# Patient Record
Sex: MALE | Race: WHITE | NOT HISPANIC OR LATINO
[De-identification: names, ages, dates, MRNs, and addresses within clinical notes are randomized per-mention and may not be internally consistent; named-entity substitution may affect disease eponyms.]

---

## 2020-01-10 ENCOUNTER — APPOINTMENT (OUTPATIENT)
Dept: CARDIOLOGY | Facility: CLINIC | Age: 55
End: 2020-01-10

## 2020-02-06 ENCOUNTER — APPOINTMENT (OUTPATIENT)
Dept: CARDIOLOGY | Facility: CLINIC | Age: 55
End: 2020-02-06
Payer: COMMERCIAL

## 2020-02-06 PROCEDURE — 93000 ELECTROCARDIOGRAM COMPLETE: CPT

## 2020-02-06 PROCEDURE — 99214 OFFICE O/P EST MOD 30 MIN: CPT

## 2020-02-20 PROBLEM — Z00.00 ENCOUNTER FOR PREVENTIVE HEALTH EXAMINATION: Status: ACTIVE | Noted: 2020-02-20

## 2020-07-15 ENCOUNTER — APPOINTMENT (OUTPATIENT)
Dept: CARDIOLOGY | Facility: CLINIC | Age: 55
End: 2020-07-15

## 2020-07-30 ENCOUNTER — RECORD ABSTRACTING (OUTPATIENT)
Age: 55
End: 2020-07-30

## 2020-07-30 DIAGNOSIS — I73.00 RAYNAUD'S SYNDROME W/OUT GANGRENE: ICD-10-CM

## 2020-07-30 DIAGNOSIS — Z86.59 PERSONAL HISTORY OF OTHER MENTAL AND BEHAVIORAL DISORDERS: ICD-10-CM

## 2020-07-30 DIAGNOSIS — Z82.49 FAMILY HISTORY OF ISCHEMIC HEART DISEASE AND OTHER DISEASES OF THE CIRCULATORY SYSTEM: ICD-10-CM

## 2020-07-30 DIAGNOSIS — E66.9 OBESITY, UNSPECIFIED: ICD-10-CM

## 2020-07-30 DIAGNOSIS — Z78.9 OTHER SPECIFIED HEALTH STATUS: ICD-10-CM

## 2020-07-30 DIAGNOSIS — Z86.79 PERSONAL HISTORY OF OTHER DISEASES OF THE CIRCULATORY SYSTEM: ICD-10-CM

## 2020-07-30 RX ORDER — ALPRAZOLAM 0.25 MG/1
0.25 TABLET ORAL
Refills: 0 | Status: ACTIVE | COMMUNITY

## 2020-07-30 RX ORDER — ASCORBIC ACID 500 MG
TABLET ORAL
Refills: 0 | Status: ACTIVE | COMMUNITY

## 2020-08-12 ENCOUNTER — APPOINTMENT (OUTPATIENT)
Dept: CARDIOLOGY | Facility: CLINIC | Age: 55
End: 2020-08-12

## 2021-01-26 ENCOUNTER — APPOINTMENT (OUTPATIENT)
Dept: CARDIOLOGY | Facility: CLINIC | Age: 56
End: 2021-01-26
Payer: COMMERCIAL

## 2021-01-26 VITALS
DIASTOLIC BLOOD PRESSURE: 90 MMHG | SYSTOLIC BLOOD PRESSURE: 142 MMHG | WEIGHT: 243 LBS | BODY MASS INDEX: 36.83 KG/M2 | HEIGHT: 68 IN

## 2021-01-26 PROCEDURE — 93000 ELECTROCARDIOGRAM COMPLETE: CPT

## 2021-01-26 PROCEDURE — 99214 OFFICE O/P EST MOD 30 MIN: CPT

## 2021-01-26 PROCEDURE — 99072 ADDL SUPL MATRL&STAF TM PHE: CPT

## 2021-01-26 RX ORDER — AMLODIPINE BESYLATE 5 MG/1
5 TABLET ORAL DAILY
Qty: 90 | Refills: 0 | Status: DISCONTINUED | COMMUNITY
End: 2021-01-26

## 2021-01-27 RX ORDER — TESTOSTERONE 20.25 MG/1.25G
1.62 GEL, METERED TRANSDERMAL
Qty: 75 | Refills: 0 | Status: ACTIVE | COMMUNITY
Start: 2020-09-01

## 2021-01-27 NOTE — ASSESSMENT
[FreeTextEntry1] : 55-yo male with h/o obesity, HTN, hyperlipidemia.\par Episodes of chest pain c/w angina on exertion.\par \par Plan:\par Continue Bystolic and Rosuvastatin.\par Start ASA 81 mg daily.\par Start Amlodipine 5 mg in the evening.\par 2D ECHO and BW recently done by PMD, will review.\par Exercise MPI.\par F/u in 2 weeks.\par \par Jairon Weinstein MD\par

## 2021-01-27 NOTE — PHYSICAL EXAM
[General Appearance - Well Developed] : well developed [Normal Appearance] : normal appearance [Well Groomed] : well groomed [General Appearance - Well Nourished] : well nourished [No Deformities] : no deformities [General Appearance - In No Acute Distress] : no acute distress [Normal Conjunctiva] : the conjunctiva exhibited no abnormalities [Respiration, Rhythm And Depth] : normal respiratory rhythm and effort [Exaggerated Use Of Accessory Muscles For Inspiration] : no accessory muscle use [Auscultation Breath Sounds / Voice Sounds] : lungs were clear to auscultation bilaterally [Heart Rate And Rhythm] : heart rate and rhythm were normal [Heart Sounds] : normal S1 and S2 [Murmurs] : no murmurs present [Arterial Pulses Normal] : the arterial pulses were normal [Edema] : no peripheral edema present [Abdomen Soft] : soft [Bowel Sounds] : normal bowel sounds [Abdomen Tenderness] : non-tender [Cyanosis, Localized] : no localized cyanosis [Skin Color & Pigmentation] : normal skin color and pigmentation [] : no rash [Oriented To Time, Place, And Person] : oriented to person, place, and time

## 2021-01-27 NOTE — HISTORY OF PRESENT ILLNESS
[FreeTextEntry1] : Patient is a 55-yo male with h/o HTN and hyperlipidemia. Patient has retired in the last few months, has not been exercising, gained weight. In the last few weeks, he has experienced substernal chest tightness with ambulation, relieved by rest. He denies CP or SOB at rest.\par \par His BP has also been poorly controlled.

## 2021-01-27 NOTE — REVIEW OF SYSTEMS
[Blurry Vision] : no blurred vision [Seeing Double (Diplopia)] : no diplopia [see HPI] : see HPI [Lower Ext Edema] : no extremity edema [Palpitations] : no palpitations [Leg Claudication] : no intermittent leg claudication [Skin: A Rash] : no rash: [Anxiety] : no anxiety [Negative] : Endocrine

## 2021-02-05 ENCOUNTER — RESULT REVIEW (OUTPATIENT)
Age: 56
End: 2021-02-05

## 2021-02-05 ENCOUNTER — OUTPATIENT (OUTPATIENT)
Dept: OUTPATIENT SERVICES | Facility: HOSPITAL | Age: 56
LOS: 1 days | Discharge: HOME | End: 2021-02-05
Payer: COMMERCIAL

## 2021-02-05 DIAGNOSIS — R07.9 CHEST PAIN, UNSPECIFIED: ICD-10-CM

## 2021-02-05 PROCEDURE — 93018 CV STRESS TEST I&R ONLY: CPT

## 2021-02-05 PROCEDURE — 78452 HT MUSCLE IMAGE SPECT MULT: CPT | Mod: 26

## 2021-02-09 ENCOUNTER — APPOINTMENT (OUTPATIENT)
Dept: CARDIOLOGY | Facility: CLINIC | Age: 56
End: 2021-02-09
Payer: COMMERCIAL

## 2021-02-09 VITALS
TEMPERATURE: 98.3 F | DIASTOLIC BLOOD PRESSURE: 90 MMHG | WEIGHT: 243 LBS | HEIGHT: 68 IN | BODY MASS INDEX: 36.83 KG/M2 | SYSTOLIC BLOOD PRESSURE: 139 MMHG

## 2021-02-09 VITALS — HEART RATE: 76 BPM

## 2021-02-09 PROCEDURE — 99214 OFFICE O/P EST MOD 30 MIN: CPT

## 2021-02-09 PROCEDURE — 93000 ELECTROCARDIOGRAM COMPLETE: CPT

## 2021-02-09 PROCEDURE — 99072 ADDL SUPL MATRL&STAF TM PHE: CPT

## 2021-02-09 NOTE — HISTORY OF PRESENT ILLNESS
[FreeTextEntry1] : Patient is a 56-yo male with h/o HTN and hyperlipidemia. Patient has retired in the last few months, has not been exercising, gained weight. In the last few weeks, he has experienced substernal chest tightness with ambulation, relieved by rest. He denies CP or SOB at rest.\par \par His BP has also been better controlled since last visit.\par \par GFR 62.\par .\par \par MPI (reviewed personally): severe inferior, inferolateral ischemia.

## 2021-02-09 NOTE — PHYSICAL EXAM
[General Appearance - Well Developed] : well developed [Normal Appearance] : normal appearance [Well Groomed] : well groomed [General Appearance - Well Nourished] : well nourished [No Deformities] : no deformities [General Appearance - In No Acute Distress] : no acute distress [Normal Conjunctiva] : the conjunctiva exhibited no abnormalities [Respiration, Rhythm And Depth] : normal respiratory rhythm and effort [Exaggerated Use Of Accessory Muscles For Inspiration] : no accessory muscle use [Auscultation Breath Sounds / Voice Sounds] : lungs were clear to auscultation bilaterally [Heart Rate And Rhythm] : heart rate and rhythm were normal [Heart Sounds] : normal S1 and S2 [Murmurs] : no murmurs present [Edema] : no peripheral edema present [Arterial Pulses Normal] : the arterial pulses were normal [Bowel Sounds] : normal bowel sounds [Abdomen Soft] : soft [Abdomen Tenderness] : non-tender [Skin Color & Pigmentation] : normal skin color and pigmentation [Cyanosis, Localized] : no localized cyanosis [Oriented To Time, Place, And Person] : oriented to person, place, and time [] : no rash

## 2021-02-09 NOTE — ASSESSMENT
[FreeTextEntry1] : 56-yo male with h/o HTN, hyperlipidemia who presents with progressive angina on exertion (class 3 at this point), evidence of ischemia in RCA/LCX territory.\par \par Plan>\par Continue ASA, Bystolic, Amlodipine.\par Add Imdur 30 mg daily.\par Increase Rosuvastatin to 20 mg daily.\par Awaiting ECHO report from PMD, may have to repeat.\par C discussed with patient and wife, all questions answered. Will schedule next week.\par Patient advised to avoid strenuous activity until then.\par \par \par Jairon Weinstein MD\par

## 2021-02-11 ENCOUNTER — INPATIENT (INPATIENT)
Facility: HOSPITAL | Age: 56
LOS: 4 days | Discharge: HOME HEALTH | End: 2021-02-16
Attending: THORACIC SURGERY (CARDIOTHORACIC VASCULAR SURGERY) | Admitting: THORACIC SURGERY (CARDIOTHORACIC VASCULAR SURGERY)
Payer: COMMERCIAL

## 2021-02-11 VITALS
DIASTOLIC BLOOD PRESSURE: 90 MMHG | RESPIRATION RATE: 20 BRPM | HEART RATE: 78 BPM | WEIGHT: 238.1 LBS | TEMPERATURE: 97 F | SYSTOLIC BLOOD PRESSURE: 148 MMHG | OXYGEN SATURATION: 98 %

## 2021-02-11 LAB
ALBUMIN SERPL ELPH-MCNC: 4.9 G/DL — SIGNIFICANT CHANGE UP (ref 3.5–5.2)
ALP SERPL-CCNC: 45 U/L — SIGNIFICANT CHANGE UP (ref 30–115)
ALT FLD-CCNC: 34 U/L — SIGNIFICANT CHANGE UP (ref 0–41)
ANION GAP SERPL CALC-SCNC: 9 MMOL/L — SIGNIFICANT CHANGE UP (ref 7–14)
APTT BLD: 36.7 SEC — SIGNIFICANT CHANGE UP (ref 27–39.2)
AST SERPL-CCNC: 33 U/L — SIGNIFICANT CHANGE UP (ref 0–41)
BASOPHILS # BLD AUTO: 0.06 K/UL — SIGNIFICANT CHANGE UP (ref 0–0.2)
BASOPHILS NFR BLD AUTO: 0.7 % — SIGNIFICANT CHANGE UP (ref 0–1)
BILIRUB SERPL-MCNC: 1 MG/DL — SIGNIFICANT CHANGE UP (ref 0.2–1.2)
BLD GP AB SCN SERPL QL: SIGNIFICANT CHANGE UP
BUN SERPL-MCNC: 18 MG/DL — SIGNIFICANT CHANGE UP (ref 10–20)
CALCIUM SERPL-MCNC: 9.5 MG/DL — SIGNIFICANT CHANGE UP (ref 8.5–10.1)
CHLORIDE SERPL-SCNC: 99 MMOL/L — SIGNIFICANT CHANGE UP (ref 98–110)
CO2 SERPL-SCNC: 27 MMOL/L — SIGNIFICANT CHANGE UP (ref 17–32)
CREAT SERPL-MCNC: 1.1 MG/DL — SIGNIFICANT CHANGE UP (ref 0.7–1.5)
EOSINOPHIL # BLD AUTO: 0.12 K/UL — SIGNIFICANT CHANGE UP (ref 0–0.7)
EOSINOPHIL NFR BLD AUTO: 1.4 % — SIGNIFICANT CHANGE UP (ref 0–8)
GLUCOSE BLDC GLUCOMTR-MCNC: 92 MG/DL — SIGNIFICANT CHANGE UP (ref 70–99)
GLUCOSE SERPL-MCNC: 96 MG/DL — SIGNIFICANT CHANGE UP (ref 70–99)
HCT VFR BLD CALC: 42.8 % — SIGNIFICANT CHANGE UP (ref 42–52)
HGB BLD-MCNC: 14.9 G/DL — SIGNIFICANT CHANGE UP (ref 14–18)
IMM GRANULOCYTES NFR BLD AUTO: 0.5 % — HIGH (ref 0.1–0.3)
INR BLD: 1.1 RATIO — SIGNIFICANT CHANGE UP (ref 0.65–1.3)
LYMPHOCYTES # BLD AUTO: 2.78 K/UL — SIGNIFICANT CHANGE UP (ref 1.2–3.4)
LYMPHOCYTES # BLD AUTO: 33.5 % — SIGNIFICANT CHANGE UP (ref 20.5–51.1)
MAGNESIUM SERPL-MCNC: 2.1 MG/DL — SIGNIFICANT CHANGE UP (ref 1.8–2.4)
MCHC RBC-ENTMCNC: 30.4 PG — SIGNIFICANT CHANGE UP (ref 27–31)
MCHC RBC-ENTMCNC: 34.8 G/DL — SIGNIFICANT CHANGE UP (ref 32–37)
MCV RBC AUTO: 87.3 FL — SIGNIFICANT CHANGE UP (ref 80–94)
MONOCYTES # BLD AUTO: 0.56 K/UL — SIGNIFICANT CHANGE UP (ref 0.1–0.6)
MONOCYTES NFR BLD AUTO: 6.7 % — SIGNIFICANT CHANGE UP (ref 1.7–9.3)
NEUTROPHILS # BLD AUTO: 4.75 K/UL — SIGNIFICANT CHANGE UP (ref 1.4–6.5)
NEUTROPHILS NFR BLD AUTO: 57.2 % — SIGNIFICANT CHANGE UP (ref 42.2–75.2)
NRBC # BLD: 0 /100 WBCS — SIGNIFICANT CHANGE UP (ref 0–0)
NT-PROBNP SERPL-SCNC: 98 PG/ML — SIGNIFICANT CHANGE UP (ref 0–300)
PLATELET # BLD AUTO: 197 K/UL — SIGNIFICANT CHANGE UP (ref 130–400)
POTASSIUM SERPL-MCNC: 4.2 MMOL/L — SIGNIFICANT CHANGE UP (ref 3.5–5)
POTASSIUM SERPL-SCNC: 4.2 MMOL/L — SIGNIFICANT CHANGE UP (ref 3.5–5)
PROT SERPL-MCNC: 8 G/DL — SIGNIFICANT CHANGE UP (ref 6–8)
PROTHROM AB SERPL-ACNC: 12.7 SEC — SIGNIFICANT CHANGE UP (ref 9.95–12.87)
RAPID RVP RESULT: SIGNIFICANT CHANGE UP
RBC # BLD: 4.9 M/UL — SIGNIFICANT CHANGE UP (ref 4.7–6.1)
RBC # FLD: 13.2 % — SIGNIFICANT CHANGE UP (ref 11.5–14.5)
SARS-COV-2 RNA SPEC QL NAA+PROBE: SIGNIFICANT CHANGE UP
SODIUM SERPL-SCNC: 135 MMOL/L — SIGNIFICANT CHANGE UP (ref 135–146)
TROPONIN T SERPL-MCNC: <0.01 NG/ML — SIGNIFICANT CHANGE UP
WBC # BLD: 8.31 K/UL — SIGNIFICANT CHANGE UP (ref 4.8–10.8)
WBC # FLD AUTO: 8.31 K/UL — SIGNIFICANT CHANGE UP (ref 4.8–10.8)

## 2021-02-11 PROCEDURE — 93010 ELECTROCARDIOGRAM REPORT: CPT

## 2021-02-11 PROCEDURE — 99285 EMERGENCY DEPT VISIT HI MDM: CPT

## 2021-02-11 PROCEDURE — 99253 IP/OBS CNSLTJ NEW/EST LOW 45: CPT

## 2021-02-11 PROCEDURE — 71045 X-RAY EXAM CHEST 1 VIEW: CPT | Mod: 26

## 2021-02-11 PROCEDURE — 93458 L HRT ARTERY/VENTRICLE ANGIO: CPT | Mod: 26,GC

## 2021-02-11 RX ORDER — SODIUM CHLORIDE 9 MG/ML
1000 INJECTION INTRAMUSCULAR; INTRAVENOUS; SUBCUTANEOUS
Refills: 0 | Status: DISCONTINUED | OUTPATIENT
Start: 2021-02-11 | End: 2021-02-16

## 2021-02-11 RX ORDER — ROSUVASTATIN CALCIUM 5 MG/1
1 TABLET ORAL
Qty: 0 | Refills: 0 | DISCHARGE

## 2021-02-11 RX ORDER — METOPROLOL TARTRATE 50 MG
25 TABLET ORAL DAILY
Refills: 0 | Status: DISCONTINUED | OUTPATIENT
Start: 2021-02-11 | End: 2021-02-12

## 2021-02-11 RX ORDER — ATORVASTATIN CALCIUM 80 MG/1
80 TABLET, FILM COATED ORAL AT BEDTIME
Refills: 0 | Status: DISCONTINUED | OUTPATIENT
Start: 2021-02-11 | End: 2021-02-12

## 2021-02-11 RX ORDER — CHLORHEXIDINE GLUCONATE 213 G/1000ML
1 SOLUTION TOPICAL
Refills: 0 | Status: DISCONTINUED | OUTPATIENT
Start: 2021-02-11 | End: 2021-02-12

## 2021-02-11 RX ORDER — ISOSORBIDE MONONITRATE 60 MG/1
30 TABLET, EXTENDED RELEASE ORAL DAILY
Refills: 0 | Status: DISCONTINUED | OUTPATIENT
Start: 2021-02-11 | End: 2021-02-12

## 2021-02-11 RX ORDER — SODIUM CHLORIDE 9 MG/ML
300 INJECTION INTRAMUSCULAR; INTRAVENOUS; SUBCUTANEOUS ONCE
Refills: 0 | Status: DISCONTINUED | OUTPATIENT
Start: 2021-02-11 | End: 2021-02-16

## 2021-02-11 RX ORDER — LANOLIN ALCOHOL/MO/W.PET/CERES
10 CREAM (GRAM) TOPICAL AT BEDTIME
Refills: 0 | Status: DISCONTINUED | OUTPATIENT
Start: 2021-02-11 | End: 2021-02-12

## 2021-02-11 RX ORDER — ASPIRIN/CALCIUM CARB/MAGNESIUM 324 MG
81 TABLET ORAL DAILY
Refills: 0 | Status: DISCONTINUED | OUTPATIENT
Start: 2021-02-11 | End: 2021-02-12

## 2021-02-11 RX ADMIN — ATORVASTATIN CALCIUM 80 MILLIGRAM(S): 80 TABLET, FILM COATED ORAL at 23:27

## 2021-02-11 RX ADMIN — Medication 10 MILLIGRAM(S): at 23:27

## 2021-02-11 RX ADMIN — SODIUM CHLORIDE 75 MILLILITER(S): 9 INJECTION INTRAMUSCULAR; INTRAVENOUS; SUBCUTANEOUS at 23:14

## 2021-02-11 NOTE — H&P ADULT - NSHPPHYSICALEXAM_GEN_ALL_CORE
This is a 56 year old male with PMHx of HTN, DLD, and ?Prediabetes who presented to the ED with chest pain.    #Chest Pain; angina  - Admit to CCU  - Cardiac cath noted triple vessel disease with large RCA. 99% subtotal lesions. AUC score 7  - Statin changed to Lipitor 80mg (high intensity)  - Start on ASA 81mg  - Check Lipid panel and a1c  - TTE ordered  - IV hydration post cath for kidney protection  - CT surgery consulted for evaluation   - Sheath to be removed in two hours.   - Heparin drip to be started 4 hours after removal of the radial sheath  - At that time begin following PTTs and maintain therapeutic range   - Start Metoprolol 25mg daily  - Home Isosorbide Mononitrate 30mg daily can be resumed    #HTN  - Started on Metoprolol 25mg daily  - Holding home bystolic 20mg and amlodipine 5mg daily for now     #HLD   - Start on Lipitor 80mg in place of home rosuvastatin 5mg     Activity: As tolerated (after sheath removal)  Diet: DASH  DVTppx: Heparin drip  GI ppx: Not indicated  Code Status: Full Code  DISPO: Acute

## 2021-02-11 NOTE — CHART NOTE - NSCHARTNOTEFT_GEN_A_CORE
Pre cath note:    Indication:                     [ ] STEMI                [ ] NSTEMI                 [ ] Acute coronary syndrome                   [ ] Unstable Angina   [ ] high risk  [ ] intermediate risk  [ ] low risk                   [ x] Stable Angina     non-invasive testing:        exercise stress          Date:         2/5/20                    result: [ ] high risk  [ x] intermediate risk  [ ] low risk                   [ ] Other:     Anti- Anginal medications:                    [ ] not used                       [x ] used                   [ ] not used but strong indication not to use    Ejection Fraction                   [ ] <29            [ ] 30-39%   [ ] 40-49%     [ ]>50%    CHF                   [ ] active (within last 14 days on meds   [ ] Chronic (on meds but no exacerbation)    COPD                   [ ] mild (on chronic bronchodilators)  [ ] moderate (on chronic steroid therapy)      [ ] severe (indication for home O2 or PACO2 >50)    Other risk factors:                     [ ] Previous MI                     [ ] CVA/ stroke                    [ ] carotid stent/ CEA                    [ ] PVD/PAD- (arterial aneurysm, non-palpable pulses, tortuous vessel with inability to insert catheter, infra-renal dissection, renal or subclavian artery stenosis)                    [ ] diabetic                    [ ] previous CABG                    [ ] Renal Failure                           14.9   8.31  )-----------( 197      ( 11 Feb 2021 15:49 )             42.8                                -Patient Scheduled for LHC/PCI tonight.   -Keep NPO   -Hold Anticoagulation prior to PCI  -Start IV Fluids NS at : 3ml/Kg for 1 Hr prior to Procedure                                          1ml/KG for 1 Hr prior to Procedure if patient has signs of Heart Failure                                             PREOPERATIVE DAY OF PROCEDURE EVALUATION:  I have personally seen and examined the patient.  I agree with the history and physical which I have reviewed and noted any changes below.  (Signed electronically by __________)  02-11-21 @ 16:47 Pre cath note:    Indication:                     [ ] STEMI                [ ] NSTEMI                 [ ] Acute coronary syndrome                   [X ] Unstable Angina   [ ] high risk  [ ] intermediate risk  [ ] low risk                   [ ] Stable Angina     non-invasive testing:        exercise stress          Date:         2/5/20                    result: [ ] high risk  [ x] intermediate risk  [ ] low risk                   [ ] Other:     Anti- Anginal medications:                    [ ] not used                       [x ] used                   [ ] not used but strong indication not to use    Ejection Fraction                   [ ] <29            [ ] 30-39%   [ ] 40-49%     [X ]>50%    CHF                   [ ] active (within last 14 days on meds   [ ] Chronic (on meds but no exacerbation)    COPD                   [ ] mild (on chronic bronchodilators)  [ ] moderate (on chronic steroid therapy)      [ ] severe (indication for home O2 or PACO2 >50)    Other risk factors:                     [ ] Previous MI                     [ ] CVA/ stroke                    [ ] carotid stent/ CEA                    [ ] PVD/PAD- (arterial aneurysm, non-palpable pulses, tortuous vessel with inability to insert catheter, infra-renal dissection, renal or subclavian artery stenosis)                    [ ] diabetic                    [ ] previous CABG                    [ ] Renal Failure                           14.9   8.31  )-----------( 197      ( 11 Feb 2021 15:49 )             42.8                                -Patient Scheduled for LHC/PCI tonight.   -Keep NPO   -Hold Anticoagulation prior to PCI  -Start IV Fluids NS at : 3ml/Kg for 1 Hr prior to Procedure                                          1ml/KG for 1 Hr prior to Procedure if patient has signs of Heart Failure                                             PREOPERATIVE DAY OF PROCEDURE EVALUATION:  I have personally seen and examined the patient.  I agree with the history and physical which I have reviewed and noted any changes below.  (Signed electronically by __________)  02-11-21 @ 16:47

## 2021-02-11 NOTE — CONSULT NOTE ADULT - ASSESSMENT
IMPRESSION  - Angina pectoris  - Positive stress test showing inferior ischemic changes.   - HTN, DL      PLAN  - Rapid covid sent  - Initial labs sent  - check a1c, lipid profile.   - Cw asa (started last month), bystolic, amlodipine, imdur, rosuvastatin 20  - Check tte  - keep npo for cath today

## 2021-02-11 NOTE — ED ADULT NURSE NOTE - OBJECTIVE STATEMENT
Pt sent in by cardiologist for stent. Pt states he was supposed to go on Tuesday but began having chest pain after lifting heavy object.

## 2021-02-11 NOTE — H&P ADULT - NSHPREVIEWOFSYSTEMS_GEN_ALL_CORE
General: No fevers, chills, weight changes	  Skin/Breast: No skin rashes or wounds  Ophthalmologic: No blurry vision, double vision, recent changes in vision  ENMT: No difficulty hearing, ringing in ears, nasal discharge, throat pain, difficulty swallowing  Respiratory and Thorax: No coughing, wheezing, shortness of breath  Cardiovascular: + chest pain on exertion, No palpitations  Gastrointestinal: No abdominal pain, constipation, diarrhea, nausea, vomiting  Genitourinary: No dysuria, polyuria, pyuria, hematuria  Musculoskeletal: No muscle aches or joint aches  Neurological: No numbness or tingling  Psychiatric: Regular mood  Hematology/Lymphatics: No easy bruising	  Endocrine: No hot or cold intolerance

## 2021-02-11 NOTE — ED PROVIDER NOTE - PROGRESS NOTE DETAILS
d/w cardiology, will come see the patient. plan is for merary to place stent tonight. d/w cardiology, will come see the patient. plan is for merary to place stent tonight. admit to tele after labs are back. pt transferred to cath lab before admission, will sign out to mar.

## 2021-02-11 NOTE — ED PROVIDER NOTE - ATTENDING CONTRIBUTION TO CARE
56yoM with h/o HTN, HLD, DM, presents for cath per Dr. Weinstein, had ischemic stress test 1 wk ago, having nonradiating midsternal CP and SOB with exertion x3-4 mths, recurrence today with lifting something however, states lasted 3-4 seconds, not 1 hr, took 1 ASA, now entirely asymptomatic. Denies fever, cough, leg swelling, v/d, and all other symptoms. On exam, afebrile, hemodynamically stable, saturating well, NAD, well appearing, sitting comfortably in bed, no WOB, speaking full sentences, head NCAT, EOMI grossly, anicteric, MMM, no JVD, RRR, nml S1/S2, no m/r/g, lungs CTAB, no w/r/r, abd soft, NT, ND, nml BS, no rebound or guarding, AAO, CN's 3-12 grossly intact, [place] spontaneously, no leg cyanosis or edema, 2+ symm radial pulses, skin warm, well perfused, no rashes or hives. Character low suspicion for PE and no e/o DVT or tachycardia/hypoxia. Character lows suspicion for dissection and no murmur or pulse asymmetry. No e/o PNA or PTX. Concern for ACS, took ASA at home and asymptomatic now. Cardio aware PTA and planning for cath later today. Patient well appearing, hemodynamically stable. Admitted to internal medicine for further monitoring, w/u, and care.

## 2021-02-11 NOTE — H&P ADULT - HISTORY OF PRESENT ILLNESS
This is a 56 year old male with PMHx of HTN, DLD, and ?Prediabetes who presented to the ED with chest pain. As per the patient reports that he had an exercise stress test done recently that was positive for symptoms and ekg changes following moderate workload. Imaging noted ischemia in the RCA area as well as hypokinesis and decreased thickening of the inferior wall. He notes that for a few months since his father passed away he has been having some stable angina. Reported exertional chest tightness alleviated with rest. He was being worked up by Dr. Weinstein with elective cath schedueld for 2/16/2021. However he noted on the day of presentation that he was lifting something heavy and he had further episode of chest tightness/pain. He had been advised to go to the ED if he had further episodes of chest pain so that is what he did.     In the ED initial vitals: /90, HR 78, T 96.8, Sat 98% on room air. Seen by cardiology who advised add on for cardiac cath. Cardiac cath performed and noted triple vessel disease. Patient to be admitted to the CCU for CT surgery workup and inpatient surgery.

## 2021-02-11 NOTE — ED ADULT TRIAGE NOTE - CHIEF COMPLAINT QUOTE
c/o chest pain and SOB x few days. Sent by cardiologist for admission. Patient has a blockage in his artery as per wife.

## 2021-02-11 NOTE — ED PROVIDER NOTE - OBJECTIVE STATEMENT
55 yo male hx of htn, hld, dm presenting with intermittent mid-sternal chest pain on exertion over the past 4-5 months, seen by merary last friday and nuc stress showed ischemia, planned for stent placement next tuesday, but had a short episode of chest pain associated with sob again 1 hour PTA while lifting heavy boxes, resolved after resting. currently denies chest pain, sob, abd pain, nausea, vomiting, cough, fever. Sent it by eMrary for plann to place stent tonight.

## 2021-02-11 NOTE — CHART NOTE - NSCHARTNOTEFT_GEN_A_CORE
PRE-OP DIAGNOSIS: angina on exertion; abnormal NST     PROCEDURE: [facility] with coronary angiography    Physician: Dr Weinstein   Assistant: Chantal    ANESTHESIA TYPE:  [  ]General Anesthesia  [  ] Sedation  [x ] Local/Regional    ESTIMATED BLOOD LOSS:    10   mL    CONDITION  [  ] Critical  [  ] Serious  [  ]Fair  [ x ]Good      SPECIMENS REMOVED (IF APPLICABLE): N/A      IV CONTRAST:    50       mL      IMPLANTS (IF APPLICABLE)      FINDINGS    Left Heart Catheterization:  LVEF%: 55  LVEDP: normal         LEFT HEART CATHETERIZATION                                    Left main  mild disease     LAD:                        Diag:    Left Circumflex:  OM:    Right Coronary Artery: very large vessel , Prox 99% lesion heavy calcified, MId 99% lesion ,   RPDA large vessel TIMI2 flow , moderate disease   RPL large vessel supplied by collaterals from left.       DOMINANCE: Right    ACCESS: right radial   CLOSURE: D stat     INTERVENTION  noen     RIGHT HEART CATHETERIZATION  PA:  PCW:  CO/CI:      POST-OP DIAGNOSIS          PLAN OF CARE  [ ] D/C Home today  [ ]  D/C in AM  [ ] Return to In-patient bed  [ ] Admit for observation  [ ] Return for staged procedure:  [ ] CT Surgery consult called  [ ]  Continue DAPT, B-blocker & Statin therapy    Results of procedure/ plan of care discussed with patient/  in detail. PRE-OP DIAGNOSIS: angina on exertion; abnormal NST     PROCEDURE: [facility] with coronary angiography    Physician: Dr Weinstein   Assistant: Chantal    ANESTHESIA TYPE:  [  ]General Anesthesia  [  ] Sedation  [x ] Local/Regional    ESTIMATED BLOOD LOSS:    10   mL    CONDITION  [  ] Critical  [  ] Serious  [  ]Fair  [ x ]Good      SPECIMENS REMOVED (IF APPLICABLE): N/A      IV CONTRAST:    50       mL      IMPLANTS (IF APPLICABLE)      FINDINGS    Left Heart Catheterization:  LVEF%: 55  LVEDP: normal         LEFT HEART CATHETERIZATION                                    Left main  mild disease     LAD:   Prox moderate disease MId 70% lesion , distal mild to moderate disease                     Diag: medium size patent    Left Circumflex: Prox moderate disease, Distal: small moderate to severe disease  OM:  60-70% lesion     Right Coronary Artery: very large vessel , Prox 99% lesion heavy calcified, MId 99% lesion ,   RPDA large vessel TIMI2 flow , moderate disease   RPL large vessel supplied by collaterals from left.       DOMINANCE: Right    ACCESS: right radial   CLOSURE: D stat     INTERVENTION  none       POST-OP DIAGNOSIS  Triple vessel disease, very large RCA with 99 subtotal lesions culprit of abnormal stress test and clinical presentation , AUC score 7 rec A for revascularization         PLAN OF CARE  -Upgrade to CCU  -asa 81 mg daily  -atorvastatin 80 mg daily  -heparin drip with no bolus to be started 4 hrs after removal of radial sheath if no bleeding and follow up PTT   -IV hydration monitor kidney function  -CT Sx evaluation   -TTE  -lipid panel and HBA1C in am     Results of procedure/ plan of care discussed with patient/  in detail. PRE-OP DIAGNOSIS: angina on exertion; abnormal NST     PROCEDURE: [facility] with coronary angiography    Physician: Dr Weinstein   Assistant: Chantal    ANESTHESIA TYPE:  [  ]General Anesthesia  [  ] Sedation  [x ] Local/Regional    ESTIMATED BLOOD LOSS:    10   mL    CONDITION  [  ] Critical  [  ] Serious  [  ]Fair  [ x ]Good      SPECIMENS REMOVED (IF APPLICABLE): N/A      IV CONTRAST:    50       mL      IMPLANTS (IF APPLICABLE)      FINDINGS    Left Heart Catheterization:  LVEF%: 55  LVEDP: normal         LEFT HEART CATHETERIZATION                                    Left main  mild disease     LAD:   Prox moderate disease MId 70% lesion , distal mild to moderate disease                     Diag: medium size patent    Left Circumflex: Prox moderate disease, Distal: small moderate to severe disease  OM:  70% lesion     Right Coronary Artery: very large vessel , Prox 99% lesion heavy calcified, MId 99% lesion ,   RPDA large vessel TIMI2 flow , moderate disease   RPL large vessel supplied by collaterals from left.       DOMINANCE: Right    ACCESS: right radial   CLOSURE: D stat     INTERVENTION  none       POST-OP DIAGNOSIS  Triple vessel disease, very large RCA with 99 subtotal lesions culprit of abnormal stress test and clinical presentation , AUC score 7 rec A for revascularization         PLAN OF CARE  -Upgrade to CCU  -asa 81 mg daily  -atorvastatin 80 mg daily  -heparin drip with no bolus to be started 4 hrs after removal of radial sheath if no bleeding and follow up PTT   -IV hydration monitor kidney function  -CT Sx evaluation   -TTE  -lipid panel and HBA1C in am     Results of procedure/ plan of care discussed with patient/  in detail.

## 2021-02-11 NOTE — CONSULT NOTE ADULT - SUBJECTIVE AND OBJECTIVE BOX
Outpt cardiologist:  Dr. Weinstein    HPI:  57 yo M,  pmh HTN, DL, with recent exercise stress test done yesterday 2/10 that was positive for symptoms and ekg changes moderate workload,  Imaging with ischemia in rca territory, and Gated imaging reveals hypokinesis and decreased thickening of inferior wall cw post stress stunning.      Pt had been having symptoms of angina with exertional chest tightness, alleviated with rest.          PAST MEDICAL & SURGICAL HISTORY  HTN  DL  CAD    FAMILY HISTORY:  FAMILY HISTORY:      SOCIAL HISTORY:  Social History: nonsmoker      ALLERGIES:  No Known Allergies      MEDICATIONS:  asa,  nitro sl, imdur 30, amlodipine 5, rosuvastatin 20, bystolic 20  Xanax, coq10, vitamins.         HOME MEDICATIONS:  Home Medications:      VITALS:   T(F): 96.8 ( @ 15:35), Max: 96.8 ( @ 15:35)  HR: 78 ( @ 15:35) (78 - 78)  BP: 148/90 ( @ 15:35) (148/90 - 148/90)  BP(mean): --  RR: 20 ( @ 15:35) (20 - 20)  SpO2: 98% ( @ 15:35) (98% - 98%)    I&O's Summary      REVIEW OF SYSTEMS:  CONSTITUTIONAL: No weakness, fevers or chills  HEENT: No visual changes, neck/ear pain  RESPIRATORY: No cough, sob  CARDIOVASCULAR: See HPI  GASTROINTESTINAL: No abdominal pain. No nausea, vomiting, diarrhea   GENITOURINARY: No dysuria, frequency or hematuria  NEUROLOGICAL: No new focal deficits  SKIN: No new rashes    PHYSICAL EXAM:  General: Not in distress.  Non-toxic appearing.   HEENT: EOMI  Cardio: regular, S1, S2, no murmur  Pulm: B/L BS.  No wheezing / crackles / rales  Abdomen: Soft, non-tender, non-distended. Normoactive bowel sounds  Extremities: No edema b/l le  Neuro: A&O x3. No focal deficits    LABS:                    Troponin trend:            RADIOLOGY:  -CXR:  -TTE:  -CCTA:  -STRESS TEST:  < from: NM Nuclear Stress Multiple (21 @ 09:52) >  Impression:  1. Positive stress test for exercise induced ischemia with respect to symptoms at a moderate workload.  2. Positive stress test for exercise induced ischemia with respect to electrocardiographic changes at a moderate workload.  3. Myocardialimaging reveals ischemia in the distribution of the right mid to distal right coronary artery  4. Gated imaging reveals hypokinesis and decreased thickening of the inferior wall with ejection fraction within normal limits consistent with post stress stunning  Recommendation:  Medical therapy.    Risk factor modification.    If clinically indicated consider coronary arteriography    < end of copied text >  -CATHETERIZATION:  -OTHER:  EC Lead ECG:   Ventricular Rate 72 BPM    Atrial Rate 72 BPM    P-R Interval 144 ms    QRS Duration 94 ms    Q-T Interval 374 ms    QTC Calculation(Bazett) 409 ms    P Axis 31 degrees    R Axis 60 degrees    T Axis 3 degrees    Diagnosis Line Normal sinus rhythm  Normal ECG    Confirmed by Alex Acosta ([id number]) on 2021 3:56:12 PM ( @ 15:34)      TELEMETRY EVENTS:   Outpt cardiologist:  Dr. Weinstein    HPI:  57 yo M,  pmh HTN, DL, with recent exercise stress test done yesterday  that was positive for symptoms and ekg changes moderate workload,  Imaging with ischemia in rca territory, and Gated imaging reveals hypokinesis and decreased thickening of inferior wall cw post stress stunning.        Pt had been having symptoms of angina with exertional chest tightness, alleviated with rest.  Following the stress test above, Pt had been scheduled for elective cardiac cath next week, but instructed to call back if any recurrence of pain.   Pt was  today while carrying something heavy pt developed further symptoms of chest pan / tightness,  and was instructed to come to ED.   Pt currently pain free,  states the pain did resolve with rest as his chest pains had been over past several months.      Pt states he had been relatively inactive over past several months,  and was previously only on bystolic for HTN.  Only recently was discovered to have DL, and asa / statin / imdur started ~week ago.    Understands the risks/benefits of cardiac cath.  On schedule for cath today .       Denied any recent infectiosn / fevers / cough / le swelling.   sob and cp when doing something exertional, such as carrying heavy items, going up stairs, etc.     PAST MEDICAL & SURGICAL HISTORY  HTN  DL  CAD    FAMILY HISTORY:  FAMILY HISTORY:      SOCIAL HISTORY:  Social History: nonsmoker      ALLERGIES:  No Known Allergies      MEDICATIONS:  asa,  nitro sl, imdur 30, amlodipine 5, rosuvastatin 20, bystolic 20  Xanax, coq10, vitamins.         HOME MEDICATIONS:  Home Medications:      VITALS:   T(F): 96.8 ( @ 15:35), Max: 96.8 ( @ 15:35)  HR: 78 ( @ 15:35) (78 - 78)  BP: 148/90 ( @ 15:35) (148/90 - 148/90)  BP(mean): --  RR: 20 ( @ 15:35) (20 - 20)  SpO2: 98% ( @ 15:35) (98% - 98%)    I&O's Summary      REVIEW OF SYSTEMS:  CONSTITUTIONAL: No weakness, fevers or chills  HEENT: No visual changes, neck/ear pain  RESPIRATORY: No cough, sob  CARDIOVASCULAR: See HPI  GASTROINTESTINAL: No abdominal pain. No nausea, vomiting, diarrhea   GENITOURINARY: No dysuria, frequency or hematuria  NEUROLOGICAL: No new focal deficits  SKIN: No new rashes    PHYSICAL EXAM:  General: Not in distress.  Non-toxic appearing.   HEENT: EOMI  Cardio: regular, S1, S2, no murmur  Pulm: B/L BS.  No wheezing / crackles / rales  Abdomen: Soft, non-tender, non-distended. Normoactive bowel sounds  Extremities: No edema b/l le  Neuro: A&O x3. No focal deficits    LABS:                    Troponin trend:            RADIOLOGY:  -CXR:  -TTE:  -CCTA:  -STRESS TEST:  < from: NM Nuclear Stress Multiple (21 @ 09:52) >  Impression:  1. Positive stress test for exercise induced ischemia with respect to symptoms at a moderate workload.  2. Positive stress test for exercise induced ischemia with respect to electrocardiographic changes at a moderate workload.  3. Myocardialimaging reveals ischemia in the distribution of the right mid to distal right coronary artery  4. Gated imaging reveals hypokinesis and decreased thickening of the inferior wall with ejection fraction within normal limits consistent with post stress stunning  Recommendation:  Medical therapy.    Risk factor modification.    If clinically indicated consider coronary arteriography    < end of copied text >  -CATHETERIZATION:  -OTHER:  EC Lead ECG:   Ventricular Rate 72 BPM    Atrial Rate 72 BPM    P-R Interval 144 ms    QRS Duration 94 ms    Q-T Interval 374 ms    QTC Calculation(Bazett) 409 ms    P Axis 31 degrees    R Axis 60 degrees    T Axis 3 degrees    Diagnosis Line Normal sinus rhythm  Normal ECG    Confirmed by Alex Acosta ([id number]) on 2021 3:56:12 PM ( @ 15:34)      TELEMETRY EVENTS:

## 2021-02-11 NOTE — H&P ADULT - ASSESSMENT
Patient seen and examined. Cath results (films and report) reviewed. Discussed with CCU team in rounds. 3 vessel disease with a thrombotic sub-total occlusion of a large dominant RCA, as well as LAD disease and mild LM disease.  Not a candidate for percutaneous intervention and will need surgical revascularization.  Keep in CCU, given the severety of his disease, presentation with ACS.  C/w ASA, Heparin gtt, no Plavix. F/u PTT  B-blocker, statin, nitrates.   CT surgery follow-up for timing of CABG.  2D echo

## 2021-02-12 ENCOUNTER — TRANSCRIPTION ENCOUNTER (OUTPATIENT)
Age: 56
End: 2021-02-12

## 2021-02-12 LAB
A1C WITH ESTIMATED AVERAGE GLUCOSE RESULT: 6 % — HIGH (ref 4–5.6)
A1C WITH ESTIMATED AVERAGE GLUCOSE RESULT: 6 % — HIGH (ref 4–5.6)
ALBUMIN SERPL ELPH-MCNC: 3.9 G/DL — SIGNIFICANT CHANGE UP (ref 3.5–5.2)
ALP SERPL-CCNC: 23 U/L — LOW (ref 30–115)
ALT FLD-CCNC: 15 U/L — SIGNIFICANT CHANGE UP (ref 0–41)
ANION GAP SERPL CALC-SCNC: 12 MMOL/L — SIGNIFICANT CHANGE UP (ref 7–14)
ANION GAP SERPL CALC-SCNC: 14 MMOL/L — SIGNIFICANT CHANGE UP (ref 7–14)
APTT BLD: 29.5 SEC — SIGNIFICANT CHANGE UP (ref 27–39.2)
APTT BLD: 36.5 SEC — SIGNIFICANT CHANGE UP (ref 27–39.2)
AST SERPL-CCNC: 33 U/L — SIGNIFICANT CHANGE UP (ref 0–41)
BASOPHILS # BLD AUTO: 0.02 K/UL — SIGNIFICANT CHANGE UP (ref 0–0.2)
BASOPHILS # BLD AUTO: 0.04 K/UL — SIGNIFICANT CHANGE UP (ref 0–0.2)
BASOPHILS NFR BLD AUTO: 0.3 % — SIGNIFICANT CHANGE UP (ref 0–1)
BASOPHILS NFR BLD AUTO: 0.6 % — SIGNIFICANT CHANGE UP (ref 0–1)
BILIRUB SERPL-MCNC: 0.8 MG/DL — SIGNIFICANT CHANGE UP (ref 0.2–1.2)
BUN SERPL-MCNC: 14 MG/DL — SIGNIFICANT CHANGE UP (ref 10–20)
BUN SERPL-MCNC: 16 MG/DL — SIGNIFICANT CHANGE UP (ref 10–20)
CALCIUM SERPL-MCNC: 8.5 MG/DL — SIGNIFICANT CHANGE UP (ref 8.5–10.1)
CALCIUM SERPL-MCNC: 9.4 MG/DL — SIGNIFICANT CHANGE UP (ref 8.5–10.1)
CHLORIDE SERPL-SCNC: 102 MMOL/L — SIGNIFICANT CHANGE UP (ref 98–110)
CHLORIDE SERPL-SCNC: 105 MMOL/L — SIGNIFICANT CHANGE UP (ref 98–110)
CHOLEST SERPL-MCNC: 123 MG/DL — SIGNIFICANT CHANGE UP
CO2 SERPL-SCNC: 20 MMOL/L — SIGNIFICANT CHANGE UP (ref 17–32)
CO2 SERPL-SCNC: 22 MMOL/L — SIGNIFICANT CHANGE UP (ref 17–32)
CREAT SERPL-MCNC: 1 MG/DL — SIGNIFICANT CHANGE UP (ref 0.7–1.5)
CREAT SERPL-MCNC: 1 MG/DL — SIGNIFICANT CHANGE UP (ref 0.7–1.5)
EOSINOPHIL # BLD AUTO: 0.03 K/UL — SIGNIFICANT CHANGE UP (ref 0–0.7)
EOSINOPHIL # BLD AUTO: 0.09 K/UL — SIGNIFICANT CHANGE UP (ref 0–0.7)
EOSINOPHIL NFR BLD AUTO: 0.4 % — SIGNIFICANT CHANGE UP (ref 0–8)
EOSINOPHIL NFR BLD AUTO: 1.4 % — SIGNIFICANT CHANGE UP (ref 0–8)
ESTIMATED AVERAGE GLUCOSE: 126 MG/DL — HIGH (ref 68–114)
ESTIMATED AVERAGE GLUCOSE: 126 MG/DL — HIGH (ref 68–114)
GAS PNL BLDA: SIGNIFICANT CHANGE UP
GLUCOSE BLDC GLUCOMTR-MCNC: 130 MG/DL — HIGH (ref 70–99)
GLUCOSE SERPL-MCNC: 102 MG/DL — HIGH (ref 70–99)
GLUCOSE SERPL-MCNC: 99 MG/DL — SIGNIFICANT CHANGE UP (ref 70–99)
HCT VFR BLD CALC: 30.2 % — LOW (ref 42–52)
HCT VFR BLD CALC: 41.9 % — LOW (ref 42–52)
HDLC SERPL-MCNC: 45 MG/DL — SIGNIFICANT CHANGE UP
HGB BLD-MCNC: 10.6 G/DL — LOW (ref 14–18)
HGB BLD-MCNC: 14.2 G/DL — SIGNIFICANT CHANGE UP (ref 14–18)
IMM GRANULOCYTES NFR BLD AUTO: 0.3 % — SIGNIFICANT CHANGE UP (ref 0.1–0.3)
IMM GRANULOCYTES NFR BLD AUTO: 0.8 % — HIGH (ref 0.1–0.3)
INR BLD: 1.38 RATIO — HIGH (ref 0.65–1.3)
LIPID PNL WITH DIRECT LDL SERPL: 76 MG/DL — SIGNIFICANT CHANGE UP
LYMPHOCYTES # BLD AUTO: 1.48 K/UL — SIGNIFICANT CHANGE UP (ref 1.2–3.4)
LYMPHOCYTES # BLD AUTO: 19.8 % — LOW (ref 20.5–51.1)
LYMPHOCYTES # BLD AUTO: 2.05 K/UL — SIGNIFICANT CHANGE UP (ref 1.2–3.4)
LYMPHOCYTES # BLD AUTO: 31.8 % — SIGNIFICANT CHANGE UP (ref 20.5–51.1)
MAGNESIUM SERPL-MCNC: 2 MG/DL — SIGNIFICANT CHANGE UP (ref 1.8–2.4)
MAGNESIUM SERPL-MCNC: 3 MG/DL — HIGH (ref 1.8–2.4)
MCHC RBC-ENTMCNC: 29.7 PG — SIGNIFICANT CHANGE UP (ref 27–31)
MCHC RBC-ENTMCNC: 30.7 PG — SIGNIFICANT CHANGE UP (ref 27–31)
MCHC RBC-ENTMCNC: 33.9 G/DL — SIGNIFICANT CHANGE UP (ref 32–37)
MCHC RBC-ENTMCNC: 35.1 G/DL — SIGNIFICANT CHANGE UP (ref 32–37)
MCV RBC AUTO: 87.5 FL — SIGNIFICANT CHANGE UP (ref 80–94)
MCV RBC AUTO: 87.7 FL — SIGNIFICANT CHANGE UP (ref 80–94)
MONOCYTES # BLD AUTO: 0.49 K/UL — SIGNIFICANT CHANGE UP (ref 0.1–0.6)
MONOCYTES # BLD AUTO: 0.63 K/UL — HIGH (ref 0.1–0.6)
MONOCYTES NFR BLD AUTO: 7.6 % — SIGNIFICANT CHANGE UP (ref 1.7–9.3)
MONOCYTES NFR BLD AUTO: 8.4 % — SIGNIFICANT CHANGE UP (ref 1.7–9.3)
MRSA PCR RESULT.: NEGATIVE — SIGNIFICANT CHANGE UP
NEUTROPHILS # BLD AUTO: 3.75 K/UL — SIGNIFICANT CHANGE UP (ref 1.4–6.5)
NEUTROPHILS # BLD AUTO: 5.25 K/UL — SIGNIFICANT CHANGE UP (ref 1.4–6.5)
NEUTROPHILS NFR BLD AUTO: 58.3 % — SIGNIFICANT CHANGE UP (ref 42.2–75.2)
NEUTROPHILS NFR BLD AUTO: 70.3 % — SIGNIFICANT CHANGE UP (ref 42.2–75.2)
NON HDL CHOLESTEROL: 78 MG/DL — SIGNIFICANT CHANGE UP
NRBC # BLD: 0 /100 WBCS — SIGNIFICANT CHANGE UP (ref 0–0)
NRBC # BLD: 0 /100 WBCS — SIGNIFICANT CHANGE UP (ref 0–0)
PLATELET # BLD AUTO: 118 K/UL — LOW (ref 130–400)
PLATELET # BLD AUTO: 193 K/UL — SIGNIFICANT CHANGE UP (ref 130–400)
POTASSIUM SERPL-MCNC: 4 MMOL/L — SIGNIFICANT CHANGE UP (ref 3.5–5)
POTASSIUM SERPL-MCNC: 4.1 MMOL/L — SIGNIFICANT CHANGE UP (ref 3.5–5)
POTASSIUM SERPL-SCNC: 4 MMOL/L — SIGNIFICANT CHANGE UP (ref 3.5–5)
POTASSIUM SERPL-SCNC: 4.1 MMOL/L — SIGNIFICANT CHANGE UP (ref 3.5–5)
PROT SERPL-MCNC: 5.4 G/DL — LOW (ref 6–8)
PROTHROM AB SERPL-ACNC: 15.9 SEC — HIGH (ref 9.95–12.87)
RBC # BLD: 3.45 M/UL — LOW (ref 4.7–6.1)
RBC # BLD: 4.78 M/UL — SIGNIFICANT CHANGE UP (ref 4.7–6.1)
RBC # FLD: 13.2 % — SIGNIFICANT CHANGE UP (ref 11.5–14.5)
RBC # FLD: 13.2 % — SIGNIFICANT CHANGE UP (ref 11.5–14.5)
SODIUM SERPL-SCNC: 137 MMOL/L — SIGNIFICANT CHANGE UP (ref 135–146)
SODIUM SERPL-SCNC: 138 MMOL/L — SIGNIFICANT CHANGE UP (ref 135–146)
TRIGL SERPL-MCNC: 44 MG/DL — SIGNIFICANT CHANGE UP
WBC # BLD: 6.44 K/UL — SIGNIFICANT CHANGE UP (ref 4.8–10.8)
WBC # BLD: 7.47 K/UL — SIGNIFICANT CHANGE UP (ref 4.8–10.8)
WBC # FLD AUTO: 6.44 K/UL — SIGNIFICANT CHANGE UP (ref 4.8–10.8)
WBC # FLD AUTO: 7.47 K/UL — SIGNIFICANT CHANGE UP (ref 4.8–10.8)

## 2021-02-12 PROCEDURE — 93306 TTE W/DOPPLER COMPLETE: CPT | Mod: 26

## 2021-02-12 PROCEDURE — 93010 ELECTROCARDIOGRAM REPORT: CPT | Mod: 77

## 2021-02-12 PROCEDURE — 71045 X-RAY EXAM CHEST 1 VIEW: CPT | Mod: 26,77

## 2021-02-12 PROCEDURE — 36620 INSERTION CATHETER ARTERY: CPT

## 2021-02-12 PROCEDURE — 99291 CRITICAL CARE FIRST HOUR: CPT

## 2021-02-12 PROCEDURE — 33508 ENDOSCOPIC VEIN HARVEST: CPT | Mod: 59

## 2021-02-12 PROCEDURE — 71045 X-RAY EXAM CHEST 1 VIEW: CPT | Mod: 26

## 2021-02-12 PROCEDURE — 93970 EXTREMITY STUDY: CPT | Mod: 26

## 2021-02-12 PROCEDURE — 93010 ELECTROCARDIOGRAM REPORT: CPT

## 2021-02-12 PROCEDURE — 35600 OPEN HRV UXTR ART 1 SGM CAB: CPT

## 2021-02-12 PROCEDURE — 33517 CABG ARTERY-VEIN SINGLE: CPT

## 2021-02-12 PROCEDURE — 93880 EXTRACRANIAL BILAT STUDY: CPT | Mod: 26

## 2021-02-12 PROCEDURE — 71250 CT THORAX DX C-: CPT | Mod: 26

## 2021-02-12 PROCEDURE — 36556 INSERT NON-TUNNEL CV CATH: CPT

## 2021-02-12 PROCEDURE — 33534 CABG ARTERIAL TWO: CPT

## 2021-02-12 RX ORDER — DEXTROSE 50 % IN WATER 50 %
50 SYRINGE (ML) INTRAVENOUS
Refills: 0 | Status: DISCONTINUED | OUTPATIENT
Start: 2021-02-12 | End: 2021-02-13

## 2021-02-12 RX ORDER — FENTANYL CITRATE 50 UG/ML
50 INJECTION INTRAVENOUS ONCE
Refills: 0 | Status: DISCONTINUED | OUTPATIENT
Start: 2021-02-12 | End: 2021-02-16

## 2021-02-12 RX ORDER — ACETAMINOPHEN 500 MG
1000 TABLET ORAL ONCE
Refills: 0 | Status: COMPLETED | OUTPATIENT
Start: 2021-02-14 | End: 2021-02-14

## 2021-02-12 RX ORDER — MEPERIDINE HYDROCHLORIDE 50 MG/ML
25 INJECTION INTRAMUSCULAR; INTRAVENOUS; SUBCUTANEOUS ONCE
Refills: 0 | Status: DISCONTINUED | OUTPATIENT
Start: 2021-02-12 | End: 2021-02-16

## 2021-02-12 RX ORDER — INSULIN HUMAN 100 [IU]/ML
1 INJECTION, SOLUTION SUBCUTANEOUS
Qty: 100 | Refills: 0 | Status: DISCONTINUED | OUTPATIENT
Start: 2021-02-12 | End: 2021-02-13

## 2021-02-12 RX ORDER — CHLORHEXIDINE GLUCONATE 213 G/1000ML
15 SOLUTION TOPICAL EVERY 12 HOURS
Refills: 0 | Status: DISCONTINUED | OUTPATIENT
Start: 2021-02-12 | End: 2021-02-13

## 2021-02-12 RX ORDER — ACETAMINOPHEN 500 MG
1000 TABLET ORAL ONCE
Refills: 0 | Status: COMPLETED | OUTPATIENT
Start: 2021-02-12 | End: 2021-02-13

## 2021-02-12 RX ORDER — ACETAMINOPHEN 500 MG
1000 TABLET ORAL ONCE
Refills: 0 | Status: COMPLETED | OUTPATIENT
Start: 2021-02-13 | End: 2021-02-13

## 2021-02-12 RX ORDER — NICARDIPINE HYDROCHLORIDE 30 MG/1
5 CAPSULE, EXTENDED RELEASE ORAL
Qty: 40 | Refills: 0 | Status: DISCONTINUED | OUTPATIENT
Start: 2021-02-12 | End: 2021-02-13

## 2021-02-12 RX ORDER — NITROGLYCERIN 6.5 MG
5 CAPSULE, EXTENDED RELEASE ORAL
Qty: 50 | Refills: 0 | Status: DISCONTINUED | OUTPATIENT
Start: 2021-02-12 | End: 2021-02-13

## 2021-02-12 RX ORDER — ASPIRIN/CALCIUM CARB/MAGNESIUM 324 MG
325 TABLET ORAL DAILY
Refills: 0 | Status: DISCONTINUED | OUTPATIENT
Start: 2021-02-13 | End: 2021-02-16

## 2021-02-12 RX ORDER — FAMOTIDINE 10 MG/ML
20 INJECTION INTRAVENOUS EVERY 12 HOURS
Refills: 0 | Status: DISCONTINUED | OUTPATIENT
Start: 2021-02-12 | End: 2021-02-13

## 2021-02-12 RX ORDER — DEXTROSE 50 % IN WATER 50 %
25 SYRINGE (ML) INTRAVENOUS
Refills: 0 | Status: DISCONTINUED | OUTPATIENT
Start: 2021-02-12 | End: 2021-02-13

## 2021-02-12 RX ORDER — POLYETHYLENE GLYCOL 3350 17 G/17G
17 POWDER, FOR SOLUTION ORAL DAILY
Refills: 0 | Status: DISCONTINUED | OUTPATIENT
Start: 2021-02-12 | End: 2021-02-16

## 2021-02-12 RX ORDER — DEXMEDETOMIDINE HYDROCHLORIDE IN 0.9% SODIUM CHLORIDE 4 UG/ML
0.25 INJECTION INTRAVENOUS
Qty: 200 | Refills: 0 | Status: DISCONTINUED | OUTPATIENT
Start: 2021-02-12 | End: 2021-02-13

## 2021-02-12 RX ORDER — VANCOMYCIN HCL 1 G
1000 VIAL (EA) INTRAVENOUS EVERY 12 HOURS
Refills: 0 | Status: COMPLETED | OUTPATIENT
Start: 2021-02-13 | End: 2021-02-13

## 2021-02-12 RX ORDER — HEPARIN SODIUM 5000 [USP'U]/ML
1300 INJECTION INTRAVENOUS; SUBCUTANEOUS
Qty: 25000 | Refills: 0 | Status: DISCONTINUED | OUTPATIENT
Start: 2021-02-12 | End: 2021-02-12

## 2021-02-12 RX ORDER — OXYCODONE HYDROCHLORIDE 5 MG/1
10 TABLET ORAL EVERY 4 HOURS
Refills: 0 | Status: DISCONTINUED | OUTPATIENT
Start: 2021-02-13 | End: 2021-02-16

## 2021-02-12 RX ORDER — SODIUM CHLORIDE 9 MG/ML
1000 INJECTION INTRAMUSCULAR; INTRAVENOUS; SUBCUTANEOUS
Refills: 0 | Status: DISCONTINUED | OUTPATIENT
Start: 2021-02-12 | End: 2021-02-16

## 2021-02-12 RX ORDER — ONDANSETRON 8 MG/1
4 TABLET, FILM COATED ORAL ONCE
Refills: 0 | Status: DISCONTINUED | OUTPATIENT
Start: 2021-02-12 | End: 2021-02-16

## 2021-02-12 RX ORDER — MIDAZOLAM HYDROCHLORIDE 1 MG/ML
1 INJECTION, SOLUTION INTRAMUSCULAR; INTRAVENOUS ONCE
Refills: 0 | Status: DISCONTINUED | OUTPATIENT
Start: 2021-02-12 | End: 2021-02-12

## 2021-02-12 RX ORDER — ACETAMINOPHEN 500 MG
1000 TABLET ORAL ONCE
Refills: 0 | Status: COMPLETED | OUTPATIENT
Start: 2021-02-14 | End: 2021-02-13

## 2021-02-12 RX ORDER — CHLORHEXIDINE GLUCONATE 213 G/1000ML
5 SOLUTION TOPICAL
Refills: 0 | Status: DISCONTINUED | OUTPATIENT
Start: 2021-02-12 | End: 2021-02-13

## 2021-02-12 RX ORDER — PROPOFOL 10 MG/ML
15 INJECTION, EMULSION INTRAVENOUS
Qty: 1000 | Refills: 0 | Status: DISCONTINUED | OUTPATIENT
Start: 2021-02-12 | End: 2021-02-13

## 2021-02-12 RX ORDER — CHLORHEXIDINE GLUCONATE 213 G/1000ML
1 SOLUTION TOPICAL
Refills: 0 | Status: DISCONTINUED | OUTPATIENT
Start: 2021-02-12 | End: 2021-02-16

## 2021-02-12 RX ORDER — DOBUTAMINE HCL 250MG/20ML
2 VIAL (ML) INTRAVENOUS
Qty: 500 | Refills: 0 | Status: DISCONTINUED | OUTPATIENT
Start: 2021-02-12 | End: 2021-02-14

## 2021-02-12 RX ORDER — OXYCODONE HYDROCHLORIDE 5 MG/1
5 TABLET ORAL EVERY 4 HOURS
Refills: 0 | Status: DISCONTINUED | OUTPATIENT
Start: 2021-02-12 | End: 2021-02-16

## 2021-02-12 RX ORDER — ASPIRIN/CALCIUM CARB/MAGNESIUM 324 MG
300 TABLET ORAL ONCE
Refills: 0 | Status: COMPLETED | OUTPATIENT
Start: 2021-02-12 | End: 2021-02-13

## 2021-02-12 RX ORDER — POTASSIUM CHLORIDE 20 MEQ
20 PACKET (EA) ORAL ONCE
Refills: 0 | Status: COMPLETED | OUTPATIENT
Start: 2021-02-12 | End: 2021-02-12

## 2021-02-12 RX ORDER — NOREPINEPHRINE BITARTRATE/D5W 8 MG/250ML
0.05 PLASTIC BAG, INJECTION (ML) INTRAVENOUS
Qty: 8 | Refills: 0 | Status: DISCONTINUED | OUTPATIENT
Start: 2021-02-12 | End: 2021-02-14

## 2021-02-12 RX ORDER — CEFAZOLIN SODIUM 1 G
1000 VIAL (EA) INJECTION EVERY 8 HOURS
Refills: 0 | Status: COMPLETED | OUTPATIENT
Start: 2021-02-13 | End: 2021-02-14

## 2021-02-12 RX ADMIN — CHLORHEXIDINE GLUCONATE 1 APPLICATION(S): 213 SOLUTION TOPICAL at 05:26

## 2021-02-12 RX ADMIN — MIDAZOLAM HYDROCHLORIDE 1 MILLIGRAM(S): 1 INJECTION, SOLUTION INTRAMUSCULAR; INTRAVENOUS at 12:59

## 2021-02-12 RX ADMIN — MIDAZOLAM HYDROCHLORIDE 1 MILLIGRAM(S): 1 INJECTION, SOLUTION INTRAMUSCULAR; INTRAVENOUS at 13:15

## 2021-02-12 RX ADMIN — Medication 25 MILLIGRAM(S): at 05:27

## 2021-02-12 RX ADMIN — Medication 100 MILLIEQUIVALENT(S): at 22:15

## 2021-02-12 NOTE — DISCHARGE NOTE PROVIDER - NSDCFUSCHEDAPPT_GEN_ALL_CORE_FT
JACKY GUZMAN ; 02/16/2021 ; [facility] JACKY GUZMAN ; 02/22/2021 ; [facility] [place] [address]

## 2021-02-12 NOTE — DISCHARGE NOTE PROVIDER - CARE PROVIDER_API CALL
Sanya Mantilla)  Surgery; Thoracic and Cardiac Surgery  [address] [place] [address]  Phone: [phone]  Fax: [phone]  Follow Up Time: 1 week    Jairon Weinstein)  Cardiovascular Disease; Nuclear Cardiology  [address] [place] [address], [facility]  [place] [address]  Phone: [phone]  Fax: [phone]  Follow Up Time: 1 month   Sanya Mantilla)  Surgery; Thoracic and Cardiac Surgery  [address] [place] [address]  Phone: [phone]  Fax: [phone]  Scheduled Appointment: 02/22/2021 02:00 PM    Jairon Weinstein)  Cardiovascular Disease; Nuclear Cardiology  [address] [place] [address], [address]  [place] [address]  Phone: [phone]  Fax: [phone]  Follow Up Time: 1 month

## 2021-02-12 NOTE — DISCHARGE NOTE PROVIDER - NSDCACTIVITY_GEN_ALL_CORE
Do not drive or operate machinery/Showering allowed/Do not make important decisions/Stairs allowed/Walking - Indoors allowed/Walking - Outdoors allowed

## 2021-02-12 NOTE — DISCHARGE NOTE PROVIDER - NSDCMRMEDTOKEN_GEN_ALL_CORE_FT
amLODIPine 5 mg oral tablet: 1 tab(s) orally once a day  Bystolic 20 mg oral tablet: 1 tab(s) orally once a day  isosorbide mononitrate 30 mg oral tablet, extended release: 1 tab(s) orally once a day (in the morning)  rosuvastatin 5 mg oral tablet: 1 tab(s) orally once a day   amLODIPine 2.5 mg oral tablet: 1 tab(s) orally once a day  aspirin 325 mg oral delayed release tablet: 1 tab(s) orally once a day  famotidine 20 mg oral tablet: 1 tab(s) orally 2 times a day  metoprolol tartrate 25 mg oral tablet: 1 tab(s) orally every 8 hours  oxycodone-acetaminophen 5 mg-325 mg oral tablet: 1 tab(s) orally every 6 hours, As Needed -for mild pain - for moderate pain MDD:4  polyethylene glycol 3350 oral powder for reconstitution: 17 gram(s) orally once a day  rosuvastatin 5 mg oral tablet: 1 tab(s) orally once a day

## 2021-02-12 NOTE — PACU DISCHARGE NOTE - COMMENTS
Discharged to CTU s/p CABG x3 with LIMA; no anesthetic complications  Vitals  /62  HR 68  CO/CI 5.3/2.4  PASP 37/23  CVP 17  Sat 100%  Temp 36.3

## 2021-02-12 NOTE — DISCHARGE NOTE PROVIDER - CARE PROVIDERS DIRECT ADDRESSES
,DirectAddress_Unknown,[email or web address]

## 2021-02-12 NOTE — PROGRESS NOTE ADULT - ASSESSMENT
IMPRESSION:  Unstable Angina - sp cath 2/11 (3V CAD)   HTN, DL      PLAN:    CNS: Avoid over sedation    HEENT: Oral care    PULMONARY:  HOB @ 45 degrees    CARDIOVASCULAR:  - 2/11  Cath:   Lm mild. Lad p mod, m70. Lcx p mod, d m-s.  Om 70.  Rca p99, m99.  Rpda mod.  Rpl supplied by left [employer].   - tte:  pending  - cw meds: asa, statin 80, imdur 30, toprol 25;  hep gtt  - plan:   >> check PTT while on hep gtt. maintain 50-70  >> f/u CT sx re: further w/u and recs  >> likely txf to CTU under CT Sx today  >> f/u echo (doing now)    GI: GI prophylaxis.  Feeding     RENAL:  Follow up lytes.  Correct as needed    INFECTIOUS DISEASE: Follow up cultures    HEMATOLOGICAL:  DVT prophylaxis.    ENDOCRINE:  Follow up FS.  Insulin protocol if needed.    MUSCULOSKELETAL: oobtc      DISPO:  likely transfer to CTU today         IMPRESSION:  Unstable Angina - sp cath 2/11 (3V CAD)   HTN, DL      PLAN:    CNS: Avoid over sedation    HEENT: Oral care    PULMONARY:  HOB @ 45 degrees.    CARDIOVASCULAR:  - 2/11  Cath:   Lm mild. Lad p mod, m70. Lcx p mod, d m-s.  Om 70.  Rca p99, m99.  Rpda mod.  [facility]   - tte:  pending  - cw meds: asa, statin 80, imdur 30, toprol 25;  hep gtt  - plan:   >> check PTT while on hep gtt. maintain 50-70  >> f/u CT sx re: further w/u and recs  >> likely txf to CTU under CT Sx today  >> f/u echo (doing now)    GI: GI prophylaxis.  Feeding     RENAL:  Follow up lytes.  Correct as needed    INFECTIOUS DISEASE: Follow up cultures    HEMATOLOGICAL:  DVT prophylaxis.    ENDOCRINE:  Follow up FS.  Insulin protocol if needed.    MUSCULOSKELETAL: oobtc      DISPO:  likely transfer to CTU today for CABG

## 2021-02-12 NOTE — PROCEDURE NOTE - NSINDICATIONS_GEN_A_CORE
critical illness/hemodynamic monitoring/venous access
blood sampling/critical patient/monitoring purposes

## 2021-02-12 NOTE — DISCHARGE NOTE PROVIDER - NSDCCPCAREPLAN_GEN_ALL_CORE_FT
PRINCIPAL DISCHARGE DIAGNOSIS  Diagnosis: ACS (acute coronary syndrome)  Assessment and Plan of Treatment:       SECONDARY DISCHARGE DIAGNOSES  Diagnosis: Coronary artery disease with other form of angina pectoris  Assessment and Plan of Treatment:

## 2021-02-12 NOTE — DISCHARGE NOTE PROVIDER - NSDCCPTREATMENT_GEN_ALL_CORE_FT
PRINCIPAL PROCEDURE  Procedure: CABG, with TABITHA  Findings and Treatment: LIMA to LAD, RSVG to OM and LRAD to RPDA

## 2021-02-12 NOTE — CONSULT NOTE ADULT - ATTENDING COMMENTS
56 yr old male  Unstable angina, progressively worsening these past few months    Admitted with   Acute Coronary Syndrome  H/o HTN and Hyperlipidemia  H/o premature CAD in family, father with disease before the age of 60  Obesity  Diet controlled Diabetes  Recent stress test positive    On heparin and nitroglycerin    Cardiac cath personally reviewed as above    Offered the patient and his wife open heart surgery with coronary artery bypass grafting  I discussed the risks, benefits, and alternatives to the surgical procedure in detail.  The risks that we discussed included but were not limited to bleeding, infection, stroke, myocardial infarction, renal failure, multi-organ failure, death, etc. amongst others were discussed in detail.  All questions. were answered.  The patient and the family want to proceed with the high risk intervention.    Will complete his preop work up and plan for the next available OR.  Patient and his wife with many questions and these were all answered.  Heavily calcified triple vessel CAD

## 2021-02-12 NOTE — BRIEF OPERATIVE NOTE - OPERATION/FINDINGS
Heavily Calcified coronary arteries Heavily Calcified coronary arteries  Deep chest  Soft friable sternum  Tolerated well.

## 2021-02-12 NOTE — CONSULT NOTE ADULT - SUBJECTIVE AND OBJECTIVE BOX
Surgeon: Dr. Mantilla    CC: Chest pain with exertion.    Consult requesting by: Cardiologist: Dr. Stephens  PMD:      HISTORY OF PRESENT ILLNESS: This is a 56 year old male non-smoker with PMHx of HTN, DLD, and Prediabetes who presented to the ED with chest pain on exertion. As per the patient reports that he had an exercise stress test done recently that was positive for symptoms and ekg changes following moderate workload. Imaging noted ischemia in the RCA area as well as hypokinesis and decreased thickening of the inferior wall. He notes that for a few months since his father passed away he has been having some stable angina. Reported exertional chest tightness alleviated with rest. He was being worked up by Dr. Weinstein with elective cath scheduled for 2/16/2021. However he noted on the day of presentation that he was lifting something heavy and he had further episode of chest tightness/pain. He had been advised to go to the ED if he had further episodes of chest pain so that is what he did.     In the ED initial vitals: /90, HR 78, T 96.8, Sat 98% on room air. Seen by cardiology who advised add on for cardiac cath. Cardiac cath performed and noted triple vessel disease. Patient was admitted to the CCU for CT surgery workup and inpatient surgery.  (11 Feb 2021 21:35)      NYHA functional class    [ ] Class I (no limitation) [ ] Class II (slight limitation) [x ] Class III (marked limitation) [ ] Class IV (symptoms at rest)      PAST MEDICAL & SURGICAL HISTORY:  Dyslipidemia  Hypertension  No significant past surgical history      MEDICATIONS  (STANDING):  aspirin  chewable 81 milliGRAM(s) Oral daily  atorvastatin 80 milliGRAM(s) Oral at bedtime  chlorhexidine 4% Liquid 1 Application(s) Topical <User Schedule>  heparin  Infusion 1300 Unit(s)/Hr (13 mL/Hr) IV Continuous <Continuous>  isosorbide   mononitrate ER Tablet (IMDUR) 30 milliGRAM(s) Oral daily  melatonin 10 milliGRAM(s) Oral at bedtime  metoprolol succinate ER 25 milliGRAM(s) Oral daily  sodium chloride 0.9% Bolus 300 milliLiter(s) IV Bolus once  sodium chloride 0.9%. 1000 milliLiter(s) (75 mL/Hr) IV Continuous <Continuous>    Home Medications:  amLODIPine 5 mg oral tablet: 1 tab(s) orally once a day (11 Feb 2021 21:35)  Bystolic 20 mg oral tablet: 1 tab(s) orally once a day (11 Feb 2021 21:35)  isosorbide mononitrate 30 mg oral tablet, extended release: 1 tab(s) orally once a day (in the morning) (11 Feb 2021 21:35)  rosuvastatin 5 mg oral tablet: 1 tab(s) orally once a day (11 Feb 2021 21:35)      Antiplatelet therapy:    N/A                       Last dose/amt:      Allergies  No Known Allergies  Intolerances      SOCIAL HISTORY:  Smoker: [ ] Yes  [ x] No        PACK YEARS:                         WHEN QUIT?  ETOH use: [ ] Yes  [ x] No              FREQUENCY / QUANTITY:  Illicit Drug use:  [ ] Yes  [ x] No  Occupation: [employer]  Lives with: Wife  Assisted device use: N/A  5 meter walk test: 1__5__sec, 2__5__sec, 3__5_sec    FAMILY HISTORY:  FH: heart disease  father and uncles    Review of Systems  CONSTITUTIONAL:  Fevers[ ] chills[ ] sweats[ ] fatigue[ ] weight loss[ ] weight gain [ ]                                     NEGATIVE [X ]   NEURO:  paresthesias[ ] seizures [ ]  syncope [ ]  confusion [ ]                                                                                NEGATIVE[ X]   EYES: glasses[ ]  blurry vision[ ]  discharge[ ] pain[ ] glaucoma [ ]                                                                          NEGATIVE[X ]   ENMT:  difficulty hearing [ ]  vertigo[ ]  dysphagia[ ] epistaxis[ ] recent dental work [ ]                                    NEGATIVE[ X]   CV:  chest pain[ x] palpitations[ ] GREEN [ ] diaphoresis [ ]                                                                                           RESPIRATORY:  wheezing[ ] SOB[ ] cough [ ] sputum[ ] hemoptysis[ ]                                                                  NEGATIVE[ ]   GI:  nausea[ ]  vomiting [ ]  diarrhea[ ] constipation [ ] melena [ ]                                                                         NEGATIVE[ X]   : hematuria[ ]  dysuria[ ] urgency[ ] incontinence[ ]                                                                                            NEGATIVE[ X]   MUSKULOSKELETAL:  arthritis[ ]  joint swelling [ ] muscle weakness [ ] Hx vein stripping [ ]                             NEGATIVE[X ]   SKIN/BREAST:  rash[ ] itching [ ]  hair loss[ ] masses[ ]                                                                                              NEGATIVE[ X]   PSYCH:  dementia [ ] depression [ ] anxiety[ ]                                                                                                               NEGATIVE[X ]   HEME/LYMPH:  bruises easily[ ] enlarged lymph nodes[ ] tender lymph nodes[ ]                                               NEGATIVE[ X]   ENDOCRINE:  cold intolerance[ ] heat intolerance[ ] polydipsia[ ]                                                                          NEGATIVE[ X]     PHYSICAL EXAM  Vital Signs Last 24 Hrs  T(C): 36.6 (12 Feb 2021 04:00), Max: 36.6 (12 Feb 2021 00:00)  T(F): 97.8 (12 Feb 2021 04:00), Max: 97.8 (12 Feb 2021 00:00)  HR: 82 (12 Feb 2021 06:00) (56 - 82)  BP: 133/89 (12 Feb 2021 06:00) (90/60 - 148/90)  BP(mean): 107 (12 Feb 2021 06:00) (68 - 108)  RR: 26 (12 Feb 2021 06:00) (14 - 26)  SpO2: 97% (12 Feb 2021 06:00) (96% - 100%)  Right arm bp:     Radial Cath                            Left arm bp;    CONSTITUTIONAL:  WNL[x ]   Neuro: WNL [ x] Normal exam oriented to person/place & time with no focal motor or sensory  deficits. Other                     Eyes:    WNL [x ] Normal exam of conjunctiva & lids, pupils equally reactive. Other     ENT:     WNL [ x] Normal exam of nasal/oral mucosa with absence of cyanosis. Other  Neck:   WNL [x ] Normal exam of jugular veins, trachea & thyroid. Other  Chest:  WNL [x ] Normal lung exam with good air movement absence of wheezes, rales, or rhonchi: Other                                                                                CV:  Auscultation: normal [x ] S3[ ] S4[ ] Irregular [ ] Rub[ ] Clicks[ ]    Murmurs none:[ x]systolic [ ]  diastolic [ ] holosystolic [ ]  Carotids: No Bruits[x ] Other                  Abdominal Aorta: normal [ x] nonpalpable[ ]Other                                                                                      GI: WNL[x ] Normal exam of abdomen, liver & spleen with no noted masses or tenderness. Other                                                                                                        Extremities: WNL[x ] Normal no evidence of cyanosis or deformity Edema: none[ ]trace[ ]1+[ ]2+[ ]3+[ ]4+[ ]  Lower Extremity Pulses: 1+ b/l dp pulses   SKIN :WNL[x ] Normal exam to inspection & palpation. Other:                                                          LABS:                        14.2   6.44  )-----------( 193      ( 12 Feb 2021 04:37 )             41.9     02-12    138  |  102  |  16  ----------------------------<  102<H>  4.0   |  22  |  1.0    Ca    9.4      12 Feb 2021 04:37  Mg     2.0     02-12    TPro  8.0  /  Alb  4.9  /  TBili  1.0  /  DBili  x   /  AST  33  /  ALT  34  /  AlkPhos  45  02-11    PT/INR - ( 11 Feb 2021 15:49 )   PT: 12.70 sec;   INR: 1.10 ratio       PTT - ( 11 Feb 2021 15:49 )  PTT:36.7 sec    CARDIAC MARKERS ( 11 Feb 2021 15:49 )  x     / <0.01 ng/mL / x     / x     / x          COVID Result: SARS-CoV-2: NotDetec: (02.11.21 @ 16:14)    Cardiac Cath: LEFT HEART CATHETERIZATION                                  LVEF%: 55  LVEDP: normal     Left main  mild disease     LAD:   Prox moderate disease MId 70% lesion , distal mild to moderate disease                     Diag: medium size patent    Left Circumflex: Prox moderate disease, Distal: small moderate to severe disease  OM:  70% lesion     Right Coronary Artery: very large vessel , Prox 99% lesion heavy calcified, MId 99% lesion ,   RPDA large vessel TIMI2 flow , moderate disease   RPL large vessel supplied by collaterals from left.     DOMINANCE: Right    POST-OP DIAGNOSIS  Triple vessel disease, very large RCA with 99 subtotal lesions culprit of abnormal stress test and clinical presentation , AUC score 7 rec A for revascularization    TTE: PENDING    CT CHEST: PENDING    Carotid duplex: PENDING    Simple PFTs: PENDING      STS Score: Isolated CAB  Risk of Mortality: 0.323%  Renal Failure: 0.535%  Permanent Stroke: 0.402%  Prolonged Ventilation: 2.270%  DSW Infection: 0.178%  Reoperation: 1.226%  Morbidity or Mortality: 3.904%  Short Length of Stay: 75.970%  Long Length of Stay: 0.819%    Impression:  CAD [x ]  Valvular  disease [ ]   Aortic Disease [ ]   ROSETTA: Yes[ ] No [ ]   CKD Stage I [ ] , Stage II [ ] , Stage III [ ], Stage IV [ ]   Anemia: Yes [ ], No [ ]  Diabetes :Yes [ ], No [ ]  Acute MI: Yes [ ], No [ ]   Heart Failure: Yes [ ] , No [ ] HFpEF [ ], HFrEF [ ]      Assessment/ Plan: 55 y/o male non-smoker; PMHx: HTN, DLD, Prediabetes, CAD w/recent + stress test as outpt w/ischemia in the RCA area. Pt presented with CP on exertion. Cardiac cath: demonstrated severe 3vCAD (LAD/OM/RCA) w/preserved EF.    -Case and plan discussed with CT surgeon Dr. Higgins/Bambi/Jose Rafael. Initial STS risk assessed and discussed with patient. Evaluation by full heart team pending. Attending note to follow. Pre-op for: CABG    Recommendations:  [x] hold Plavix  [] hold ASA if Pre-op Cardiac Valve surgery and patient without CAD  [x] hold ACEI/ARB/CCB 24 hours prior to planned procedure   [x] LUE precaution for possible radial artery harvest      Labs:  [] CBC  [] CMP  [] PT/INR/PTT  [] BNP  [x] HgA1c  [] Type and screen  [x] Urinalysis  [x] MRSA    Diagnostic studies  [] CT HEAD Non-Contrast  [x] CT Chest without contrast   [x] Carotid Duplex  [x] PFT: Simple PFT [ x]  Full [ ]  [] HONG/PVR  [x] TTE    Consultations/Evaluations   [] Renal Consult  [] Pulmonary Consult  [] Vascular Consult  [] Dental Consult   [] Hem-Onc Consult   [] GI Consult   [] Other Consultations :                 Surgeon: Dr. Mantilla    CC: Chest pain with exertion.    Consult requesting by: Cardiologist: Dr. Stephens  PMD:      HISTORY OF PRESENT ILLNESS: This is a 56 year old male non-smoker with PMHx of HTN, DLD, and Prediabetes who presented to the ED with chest pain on exertion. As per the patient reports that he had an exercise stress test done recently that was positive for symptoms and ekg changes following moderate workload. Imaging noted ischemia in the RCA area as well as hypokinesis and decreased thickening of the inferior wall. He notes that for a few months since his father passed away he has been having some stable angina. Reported exertional chest tightness alleviated with rest. He was being worked up by Dr. Weinstein with elective cath scheduled for 2/16/2021. However he noted on the day of presentation that he was lifting something heavy and he had further episode of chest tightness/pain. He had been advised to go to the ED if he had further episodes of chest pain so that is what he did.     In the ED initial vitals: /90, HR 78, T 96.8, Sat 98% on room air. Seen by cardiology who advised add on for cardiac cath. Cardiac cath performed and noted triple vessel disease. Patient was admitted to the CCU for CT surgery workup and inpatient surgery.  (11 Feb 2021 21:35)      NYHA functional class    [ ] Class I (no limitation) [ ] Class II (slight limitation) [x ] Class III (marked limitation) [ ] Class IV (symptoms at rest)      PAST MEDICAL & SURGICAL HISTORY:  Dyslipidemia  Hypertension  No significant past surgical history      MEDICATIONS  (STANDING):  aspirin  chewable 81 milliGRAM(s) Oral daily  atorvastatin 80 milliGRAM(s) Oral at bedtime  chlorhexidine 4% Liquid 1 Application(s) Topical <User Schedule>  heparin  Infusion 1300 Unit(s)/Hr (13 mL/Hr) IV Continuous <Continuous>  isosorbide   mononitrate ER Tablet (IMDUR) 30 milliGRAM(s) Oral daily  melatonin 10 milliGRAM(s) Oral at bedtime  metoprolol succinate ER 25 milliGRAM(s) Oral daily  sodium chloride 0.9% Bolus 300 milliLiter(s) IV Bolus once  sodium chloride 0.9%. 1000 milliLiter(s) (75 mL/Hr) IV Continuous <Continuous>    Home Medications:  amLODIPine 5 mg oral tablet: 1 tab(s) orally once a day (11 Feb 2021 21:35)  Bystolic 20 mg oral tablet: 1 tab(s) orally once a day (11 Feb 2021 21:35)  isosorbide mononitrate 30 mg oral tablet, extended release: 1 tab(s) orally once a day (in the morning) (11 Feb 2021 21:35)  rosuvastatin 5 mg oral tablet: 1 tab(s) orally once a day (11 Feb 2021 21:35)      Antiplatelet therapy:    N/A                       Last dose/amt:      Allergies  No Known Allergies  Intolerances      SOCIAL HISTORY:  Smoker: [ ] Yes  [ x] No        PACK YEARS:                         WHEN QUIT?  ETOH use: [ ] Yes  [ x] No              FREQUENCY / QUANTITY:  Illicit Drug use:  [ ] Yes  [ x] No  Occupation: [employer]  Lives with: Wife  Assisted device use: N/A  5 meter walk test: 1__5__sec, 2__5__sec, 3__5_sec    FAMILY HISTORY:  FH: heart disease  father and uncles    Review of Systems  CONSTITUTIONAL:  Fevers[ ] chills[ ] sweats[ ] fatigue[ ] weight loss[ ] weight gain [ ]                                     NEGATIVE [X ]   NEURO:  paresthesias[ ] seizures [ ]  syncope [ ]  confusion [ ]                                                                                NEGATIVE[ X]   EYES: glasses[ ]  blurry vision[ ]  discharge[ ] pain[ ] glaucoma [ ]                                                                          NEGATIVE[X ]   ENMT:  difficulty hearing [ ]  vertigo[ ]  dysphagia[ ] epistaxis[ ] recent dental work [ ]                                    NEGATIVE[ X]   CV:  chest pain[ x] palpitations[ ] GREEN [ ] diaphoresis [ ]                                                                                           RESPIRATORY:  wheezing[ ] SOB[ ] cough [ ] sputum[ ] hemoptysis[ ]                                                                  NEGATIVE[ ]   GI:  nausea[ ]  vomiting [ ]  diarrhea[ ] constipation [ ] melena [ ]                                                                         NEGATIVE[ X]   : hematuria[ ]  dysuria[ ] urgency[ ] incontinence[ ]                                                                                            NEGATIVE[ X]   MUSKULOSKELETAL:  arthritis[ ]  joint swelling [ ] muscle weakness [ ] Hx vein stripping [ ]                             NEGATIVE[X ]   SKIN/BREAST:  rash[ ] itching [ ]  hair loss[ ] masses[ ]                                                                                              NEGATIVE[ X]   PSYCH:  dementia [ ] depression [ ] anxiety[ ]                                                                                                               NEGATIVE[X ]   HEME/LYMPH:  bruises easily[ ] enlarged lymph nodes[ ] tender lymph nodes[ ]                                               NEGATIVE[ X]   ENDOCRINE:  cold intolerance[ ] heat intolerance[ ] polydipsia[ ]                                                                          NEGATIVE[ X]     PHYSICAL EXAM  Vital Signs Last 24 Hrs  T(C): 36.6 (12 Feb 2021 04:00), Max: 36.6 (12 Feb 2021 00:00)  T(F): 97.8 (12 Feb 2021 04:00), Max: 97.8 (12 Feb 2021 00:00)  HR: 82 (12 Feb 2021 06:00) (56 - 82)  BP: 133/89 (12 Feb 2021 06:00) (90/60 - 148/90)  BP(mean): 107 (12 Feb 2021 06:00) (68 - 108)  RR: 26 (12 Feb 2021 06:00) (14 - 26)  SpO2: 97% (12 Feb 2021 06:00) (96% - 100%)  Right arm bp:     Radial Cath                            Left arm bp;    CONSTITUTIONAL:  WNL[x ]   Neuro: WNL [ x] Normal exam oriented to person/place & time with no focal motor or sensory  deficits. Other                     Eyes:    WNL [x ] Normal exam of conjunctiva & lids, pupils equally reactive. Other     ENT:     WNL [ x] Normal exam of nasal/oral mucosa with absence of cyanosis. Other  Neck:   WNL [x ] Normal exam of jugular veins, trachea & thyroid. Other  Chest:  WNL [x ] Normal lung exam with good air movement absence of wheezes, rales, or rhonchi: Other                                                                                CV:  Auscultation: normal [x ] S3[ ] S4[ ] Irregular [ ] Rub[ ] Clicks[ ]    Murmurs none:[ x]systolic [ ]  diastolic [ ] holosystolic [ ]  Carotids: No Bruits[x ] Other                  Abdominal Aorta: normal [ x] nonpalpable[ ]Other                                                                                      GI: WNL[x ] Normal exam of abdomen, liver & spleen with no noted masses or tenderness. Other                                                                                                        Extremities: + varicosities to LLE, Edema: none[x ]trace[ ]1+[ ]2+[ ]3+[ ]4+[ ]  Lower Extremity Pulses: 1+ b/l dp pulses   SKIN :WNL[x ] Normal exam to inspection & palpation. Other:                                                          LABS:                        14.2   6.44  )-----------( 193      ( 12 Feb 2021 04:37 )             41.9     02-12    138  |  102  |  16  ----------------------------<  102<H>  4.0   |  22  |  1.0    Ca    9.4      12 Feb 2021 04:37  Mg     2.0     02-12    TPro  8.0  /  Alb  4.9  /  TBili  1.0  /  DBili  x   /  AST  33  /  ALT  34  /  AlkPhos  45  02-11    PT/INR - ( 11 Feb 2021 15:49 )   PT: 12.70 sec;   INR: 1.10 ratio       PTT - ( 11 Feb 2021 15:49 )  PTT:36.7 sec    CARDIAC MARKERS ( 11 Feb 2021 15:49 )  x     / <0.01 ng/mL / x     / x     / x          COVID Result: SARS-CoV-2: NotDetec: (02.11.21 @ 16:14)    Cardiac Cath: LEFT HEART CATHETERIZATION                                  LVEF%: 55  LVEDP: normal     Left main  mild disease     LAD:   Prox moderate disease MId 70% lesion , distal mild to moderate disease                     Diag: medium size patent    Left Circumflex: Prox moderate disease, Distal: small moderate to severe disease  OM:  70% lesion     Right Coronary Artery: very large vessel , Prox 99% lesion heavy calcified, MId 99% lesion ,   RPDA large vessel TIMI2 flow , moderate disease   RPL large vessel supplied by collaterals from left.     DOMINANCE: Right    POST-OP DIAGNOSIS  Triple vessel disease, very large RCA with 99 subtotal lesions culprit of abnormal stress test and clinical presentation , AUC score 7 rec A for revascularization    TTE: PENDING    CT CHEST: PENDING    Carotid duplex: PENDING    Simple PFTs: PENDING      STS Score: Isolated CAB  Risk of Mortality: 0.323%  Renal Failure: 0.535%  Permanent Stroke: 0.402%  Prolonged Ventilation: 2.270%  DSW Infection: 0.178%  Reoperation: 1.226%  Morbidity or Mortality: 3.904%  Short Length of Stay: 75.970%  Long Length of Stay: 0.819%    Impression:  CAD [x ]  Valvular  disease [ ]   Aortic Disease [ ]   ROSETTA: Yes[ ] No [ ]   CKD Stage I [ ] , Stage II [ ] , Stage III [ ], Stage IV [ ]   Anemia: Yes [ ], No [ ]  Diabetes :Yes [ ], No [ ]  Acute MI: Yes [ ], No [ ]   Heart Failure: Yes [ ] , No [ ] HFpEF [ ], HFrEF [ ]      Assessment/ Plan: 55 y/o male non-smoker; PMHx: HTN, DLD, Prediabetes, CAD w/recent + stress test as outpt w/ischemia in the RCA area. Pt presented with CP on exertion. Cardiac cath: demonstrated severe 3vCAD (LAD/OM/RCA) w/preserved EF.    -Case and plan discussed with CT surgeon Dr. Higgins/Bambi/Jose Rafael. Initial STS risk assessed and discussed with patient. Evaluation by full heart team pending. Attending note to follow. Pre-op for: CABG    Recommendations:  [x] hold Plavix  [] hold ASA if Pre-op Cardiac Valve surgery and patient without CAD  [x] hold ACEI/ARB/CCB 24 hours prior to planned procedure   [x] LUE precaution for possible radial artery harvest      Labs:  [] CBC  [] CMP  [] PT/INR/PTT  [] BNP  [x] HgA1c  [] Type and screen  [x] Urinalysis  [x] MRSA    Diagnostic studies  [] CT HEAD Non-Contrast  [x] CT Chest without contrast   [x] Carotid Duplex  [x] PFT: Simple PFT [ x]  Full [ ]  [] HONG/PVR  [x] TTE  [x] LE vein mapping    Consultations/Evaluations   [] Renal Consult  [] Pulmonary Consult  [] Vascular Consult  [] Dental Consult   [] Hem-Onc Consult   [] GI Consult   [] Other Consultations :                 Surgeon: Dr. Mantilla    CC: Chest pain with exertion.    Consult requesting by: Cardiologist: Dr. Stephens  PMD:    Wife: Mauricio: [phone]    HISTORY OF PRESENT ILLNESS: This is a 56 year old male non-smoker with PMHx of HTN, DLD, and Prediabetes who presented to the ED with chest pain on exertion. As per the patient reports that he had an exercise stress test done recently that was positive for symptoms and ekg changes following moderate workload. Imaging noted ischemia in the RCA area as well as hypokinesis and decreased thickening of the inferior wall. He notes that for a few months since his father passed away he has been having some stable angina. Reported exertional chest tightness alleviated with rest. He was being worked up by Dr. Weinstein with elective cath scheduled for 2/16/2021. However he noted on the day of presentation that he was lifting something heavy and he had further episode of chest tightness/pain. He had been advised to go to the ED if he had further episodes of chest pain so that is what he did.     In the ED initial vitals: /90, HR 78, T 96.8, Sat 98% on room air. Seen by cardiology who advised add on for cardiac cath. Cardiac cath performed and noted triple vessel disease. Patient was admitted to the CCU for CT surgery workup and inpatient surgery.  (11 Feb 2021 21:35)      NYHA functional class    [ ] Class I (no limitation) [ ] Class II (slight limitation) [x ] Class III (marked limitation) [ ] Class IV (symptoms at rest)      PAST MEDICAL & SURGICAL HISTORY:  Dyslipidemia  Hypertension  No significant past surgical history      MEDICATIONS  (STANDING):  aspirin  chewable 81 milliGRAM(s) Oral daily  atorvastatin 80 milliGRAM(s) Oral at bedtime  chlorhexidine 4% Liquid 1 Application(s) Topical <User Schedule>  heparin  Infusion 1300 Unit(s)/Hr (13 mL/Hr) IV Continuous <Continuous>  isosorbide   mononitrate ER Tablet (IMDUR) 30 milliGRAM(s) Oral daily  melatonin 10 milliGRAM(s) Oral at bedtime  metoprolol succinate ER 25 milliGRAM(s) Oral daily  sodium chloride 0.9% Bolus 300 milliLiter(s) IV Bolus once  sodium chloride 0.9%. 1000 milliLiter(s) (75 mL/Hr) IV Continuous <Continuous>    Home Medications:  amLODIPine 5 mg oral tablet: 1 tab(s) orally once a day (11 Feb 2021 21:35)  Bystolic 20 mg oral tablet: 1 tab(s) orally once a day (11 Feb 2021 21:35)  isosorbide mononitrate 30 mg oral tablet, extended release: 1 tab(s) orally once a day (in the morning) (11 Feb 2021 21:35)  rosuvastatin 5 mg oral tablet: 1 tab(s) orally once a day (11 Feb 2021 21:35)      Antiplatelet therapy:    N/A                       Last dose/amt:      Allergies  No Known Allergies  Intolerances      SOCIAL HISTORY:  Smoker: [ ] Yes  [ x] No        PACK YEARS:                         WHEN QUIT?  ETOH use: [ ] Yes  [ x] No              FREQUENCY / QUANTITY:  Illicit Drug use:  [ ] Yes  [ x] No  Occupation: [employer]  Lives with: Wife  Assisted device use: N/A  5 meter walk test: 1__5__sec, 2__5__sec, 3__5_sec    FAMILY HISTORY:  FH: heart disease  father and uncles    Review of Systems  CONSTITUTIONAL:  Fevers[ ] chills[ ] sweats[ ] fatigue[ ] weight loss[ ] weight gain [ ]                                     NEGATIVE [X ]   NEURO:  paresthesias[ ] seizures [ ]  syncope [ ]  confusion [ ]                                                                                NEGATIVE[ X]   EYES: glasses[ ]  blurry vision[ ]  discharge[ ] pain[ ] glaucoma [ ]                                                                          NEGATIVE[X ]   ENMT:  difficulty hearing [ ]  vertigo[ ]  dysphagia[ ] epistaxis[ ] recent dental work [ ]                                    NEGATIVE[ X]   CV:  chest pain[ x] palpitations[ ] GREEN [ ] diaphoresis [ ]                                                                                           RESPIRATORY:  wheezing[ ] SOB[ ] cough [ ] sputum[ ] hemoptysis[ ]                                                                  NEGATIVE[ ]   GI:  nausea[ ]  vomiting [ ]  diarrhea[ ] constipation [ ] melena [ ]                                                                         NEGATIVE[ X]   : hematuria[ ]  dysuria[ ] urgency[ ] incontinence[ ]                                                                                            NEGATIVE[ X]   MUSKULOSKELETAL:  arthritis[ ]  joint swelling [ ] muscle weakness [ ] Hx vein stripping [ ]                             NEGATIVE[X ]   SKIN/BREAST:  rash[ ] itching [ ]  hair loss[ ] masses[ ]                                                                                              NEGATIVE[ X]   PSYCH:  dementia [ ] depression [ ] anxiety[ ]                                                                                                               NEGATIVE[X ]   HEME/LYMPH:  bruises easily[ ] enlarged lymph nodes[ ] tender lymph nodes[ ]                                               NEGATIVE[ X]   ENDOCRINE:  cold intolerance[ ] heat intolerance[ ] polydipsia[ ]                                                                          NEGATIVE[ X]     PHYSICAL EXAM  Vital Signs Last 24 Hrs  T(C): 36.6 (12 Feb 2021 04:00), Max: 36.6 (12 Feb 2021 00:00)  T(F): 97.8 (12 Feb 2021 04:00), Max: 97.8 (12 Feb 2021 00:00)  HR: 82 (12 Feb 2021 06:00) (56 - 82)  BP: 133/89 (12 Feb 2021 06:00) (90/60 - 148/90)  BP(mean): 107 (12 Feb 2021 06:00) (68 - 108)  RR: 26 (12 Feb 2021 06:00) (14 - 26)  SpO2: 97% (12 Feb 2021 06:00) (96% - 100%)  Right arm bp:     Radial Cath                            Left arm bp;    CONSTITUTIONAL:  WNL[x ]   Neuro: WNL [ x] Normal exam oriented to person/place & time with no focal motor or sensory  deficits. Other                     Eyes:    WNL [x ] Normal exam of conjunctiva & lids, pupils equally reactive. Other     ENT:     WNL [ x] Normal exam of nasal/oral mucosa with absence of cyanosis. Other  Neck:   WNL [x ] Normal exam of jugular veins, trachea & thyroid. Other  Chest:  WNL [x ] Normal lung exam with good air movement absence of wheezes, rales, or rhonchi: Other                                                                                CV:  Auscultation: normal [x ] S3[ ] S4[ ] Irregular [ ] Rub[ ] Clicks[ ]    Murmurs none:[ x]systolic [ ]  diastolic [ ] holosystolic [ ]  Carotids: No Bruits[x ] Other                  Abdominal Aorta: normal [ x] nonpalpable[ ]Other                                                                                      GI: WNL[x ] Normal exam of abdomen, liver & spleen with no noted masses or tenderness. Other                                                                                                        Extremities: + varicosities to LLE, Edema: none[x ]trace[ ]1+[ ]2+[ ]3+[ ]4+[ ]  Lower Extremity Pulses: 1+ b/l dp pulses   SKIN :WNL[x ] Normal exam to inspection & palpation. Other:                                                          LABS:                        14.2   6.44  )-----------( 193      ( 12 Feb 2021 04:37 )             41.9     02-12    138  |  102  |  16  ----------------------------<  102<H>  4.0   |  22  |  1.0    Ca    9.4      12 Feb 2021 04:37  Mg     2.0     02-12    TPro  8.0  /  Alb  4.9  /  TBili  1.0  /  DBili  x   /  AST  33  /  ALT  34  /  AlkPhos  45  02-11    PT/INR - ( 11 Feb 2021 15:49 )   PT: 12.70 sec;   INR: 1.10 ratio       PTT - ( 11 Feb 2021 15:49 )  PTT:36.7 sec    CARDIAC MARKERS ( 11 Feb 2021 15:49 )  x     / <0.01 ng/mL / x     / x     / x          COVID Result: SARS-CoV-2: NotDetec: (02.11.21 @ 16:14)    Cardiac Cath: LEFT HEART CATHETERIZATION                                  LVEF%: 55  LVEDP: normal     Left main  mild disease     LAD:   Prox moderate disease MId 70% lesion , distal mild to moderate disease                     Diag: medium size patent    Left Circumflex: Prox moderate disease, Distal: small moderate to severe disease  OM:  70% lesion     Right Coronary Artery: very large vessel , Prox 99% lesion heavy calcified, MId 99% lesion ,   RPDA large vessel TIMI2 flow , moderate disease   RPL large vessel supplied by collaterals from left.     DOMINANCE: Right    POST-OP DIAGNOSIS  Triple vessel disease, very large RCA with 99 subtotal lesions culprit of abnormal stress test and clinical presentation , AUC score 7 rec A for revascularization    TTE: PENDING    CT CHEST: PENDING    Carotid duplex: PENDING    Simple PFTs: PENDING      STS Score: Isolated CAB  Risk of Mortality: 0.323%  Renal Failure: 0.535%  Permanent Stroke: 0.402%  Prolonged Ventilation: 2.270%  DSW Infection: 0.178%  Reoperation: 1.226%  Morbidity or Mortality: 3.904%  Short Length of Stay: 75.970%  Long Length of Stay: 0.819%    Impression:  CAD [x ]  Valvular  disease [ ]   Aortic Disease [ ]   ROSETTA: Yes[ ] No [ ]   CKD Stage I [ ] , Stage II [ ] , Stage III [ ], Stage IV [ ]   Anemia: Yes [ ], No [ ]  Diabetes :Yes [ ], No [ ]  Acute MI: Yes [ ], No [ ]   Heart Failure: Yes [ ] , No [ ] HFpEF [ ], HFrEF [ ]      Assessment/ Plan: 57 y/o male non-smoker; PMHx: HTN, DLD, Prediabetes, CAD w/recent + stress test as outpt w/ischemia in the RCA area. Pt presented with CP on exertion. Cardiac cath: demonstrated severe 3vCAD (LAD/OM/RCA) w/preserved EF.    -Case and plan discussed with CT surgeon Dr. Higgins/Bambi/Jose Rafael. Initial STS risk assessed and discussed with patient. Evaluation by full heart team pending. Attending note to follow. Pre-op for: CABG    Recommendations:  [x] hold Plavix  [] hold ASA if Pre-op Cardiac Valve surgery and patient without CAD  [x] hold ACEI/ARB/CCB 24 hours prior to planned procedure   [x] LUE precaution for possible radial artery harvest      Labs:  [] CBC  [] CMP  [] PT/INR/PTT  [] BNP  [x] HgA1c  [] Type and screen  [x] Urinalysis  [x] MRSA    Diagnostic studies  [] CT HEAD Non-Contrast  [x] CT Chest without contrast   [x] Carotid Duplex  [x] PFT: Simple PFT [ x]  Full [ ]  [] HONG/PVR  [x] TTE  [x] LE vein mapping    Consultations/Evaluations   [] Renal Consult  [] Pulmonary Consult  [] Vascular Consult  [] Dental Consult   [] Hem-Onc Consult   [] GI Consult   [] Other Consultations :

## 2021-02-12 NOTE — BRIEF OPERATIVE NOTE - NSICDXBRIEFPROCEDURE_GEN_ALL_CORE_FT
PROCEDURES:  CABG, with TABITHA 12-Feb-2021 20:07:26 LIMA to LAD, RSVG to OM and LRAD to RPDA Tony Enriquez

## 2021-02-12 NOTE — PROGRESS NOTE ADULT - SUBJECTIVE AND OBJECTIVE BOX
HPI:  This is a 56 year old male with PMHx of HTN, DLD, and ?Prediabetes who presented to the ED with chest pain. As per the patient reports that he had an exercise stress test done recently that was positive for symptoms and ekg changes following moderate workload. Imaging noted ischemia in the RCA area as well as hypokinesis and decreased thickening of the inferior wall. He notes that for a few months since his father passed away he has been having some stable angina. Reported exertional chest tightness alleviated with rest. He was being worked up by Dr. Weinstein with elective cath schedueld for 2021. However he noted on the day of presentation that he was lifting something heavy and he had further episode of chest tightness/pain. He had been advised to go to the ED if he had further episodes of chest pain so that is what he did.     In the ED initial vitals: /90, HR 78, T 96.8, Sat 98% on room air. Seen by cardiology who advised add on for cardiac cath. Cardiac cath performed and noted triple vessel disease. Patient to be admitted to the CCU for CT surgery workup and inpatient surgery.  (2021 21:35)      INTERVAL HISTORY:  Brought in overnight, cath showed triple vessel disease w/ 99% stenosis of RCA    PAST MEDICAL & SURGICAL HISTORY  Dyslipidemia    Hypertension    No significant past surgical history        ALLERGIES:  No Known Allergies      MEDICATIONS:  MEDICATIONS  (STANDING):  aspirin  chewable 81 milliGRAM(s) Oral daily  atorvastatin 80 milliGRAM(s) Oral at bedtime  chlorhexidine 4% Liquid 1 Application(s) Topical <User Schedule>  heparin  Infusion 1300 Unit(s)/Hr (13 mL/Hr) IV Continuous <Continuous>  isosorbide   mononitrate ER Tablet (IMDUR) 30 milliGRAM(s) Oral daily  melatonin 10 milliGRAM(s) Oral at bedtime  metoprolol succinate ER 25 milliGRAM(s) Oral daily  sodium chloride 0.9% Bolus 300 milliLiter(s) IV Bolus once  sodium chloride 0.9%. 1000 milliLiter(s) (75 mL/Hr) IV Continuous <Continuous>    MEDICATIONS  (PRN):      HOME MEDICATIONS:  Home Medications:  amLODIPine 5 mg oral tablet: 1 tab(s) orally once a day (2021 21:35)  Bystolic 20 mg oral tablet: 1 tab(s) orally once a day (2021 21:35)  isosorbide mononitrate 30 mg oral tablet, extended release: 1 tab(s) orally once a day (in the morning) (2021 21:35)  rosuvastatin 5 mg oral tablet: 1 tab(s) orally once a day (2021 21:35)        OBJECTIVE:  ICU Vital Signs Last 24 Hrs  T(C): 36.6 (2021 04:00), Max: 36.6 (2021 00:00)  T(F): 97.8 (2021 04:00), Max: 97.8 (2021 00:00)  HR: 82 (2021 06:00) (56 - 82)  BP: 133/89 (2021 06:00) (90/60 - 148/90)  BP(mean): 107 (2021 06:00) (68 - 108)  ABP: --  ABP(mean): --  RR: 26 (2021 06:00) (14 - 26)  SpO2: 97% (2021 06:00) (96% - 100%)      Adult Advanced Hemodynamics Last 24 Hrs  CVP(mm Hg): --  CVP(cm H2O): --  CO: --  CI: --  PA: --  PA(mean): --  PCWP: --  SVR: --  SVRI: --  PVR: --  PVRI: --  I&O's Summary    2021 07:01  -  2021 07:00  --------------------------------------------------------  IN: 1006 mL / OUT: 1500 mL / NET: -494 mL      Daily Height in cm: 172.72 (2021 23:00)    Daily Weight in k (2021 06:00)    PHYSICAL EXAM:  NEURO: patient is awake , alert and oriented  GEN: Not in acute distress  NECK: no thyroid enlargement, no JVD  LUNGS: Clear to auscultation bilaterally   CARDIOVASCULAR: S1/S2 present, RRR ,   ABD: Soft, non-tender, non-distended, +BS  EXT: No MARLON  SKIN: Intact  ACCESS Site:    LABS:                        14.2   6.44  )-----------( 193      ( 2021 04:37 )             41.9         138  |  102  |  16  ----------------------------<  102<H>  4.0   |  22  |  1.0    Ca    9.4      2021 04:37  Mg     2.0         TPro  8.0  /  Alb  4.9  /  TBili  1.0  /  DBili  x   /  AST  33  /  ALT  34  /  AlkPhos  45      PT/INR - ( 2021 15:49 )   PT: 12.70 sec;   INR: 1.10 ratio         PTT - ( 2021 15:49 )  PTT:36.7 sec  Troponin T, Serum: <0.01 ng/mL (21 @ 15:49)    CARDIAC MARKERS ( 2021 15:49 )  x     / <0.01 ng/mL / x     / x     / x            Troponin trend:    Serum Pro-Brain Natriuretic Peptide: 98 pg/mL (21 @ 15:49)     Chol 123 LDL -- HDL 45 Trig 44      RADIOLOGY:  -CXR:  -TTE:  -STRESS TEST:  -CATHETERIZATION:    ECG:    TELEMETRY EVENTS:       HPI:  This is a 56 year old male with PMHx of HTN, DLD, and ?Prediabetes who presented to the ED with chest pain. As per the patient reports that he had an exercise stress test done recently that was positive for symptoms and ekg changes following moderate workload. Imaging noted ischemia in the RCA area as well as hypokinesis and decreased thickening of the inferior wall. He notes that for a few months since his father passed away he has been having some stable angina. Reported exertional chest tightness alleviated with rest. He was being worked up by Dr. Weinstein with elective cath schedueld for 2021. However he noted on the day of presentation that he was lifting something heavy and he had further episode of chest tightness/pain. He had been advised to go to the ED if he had further episodes of chest pain so that is what he did.     In the ED initial vitals: /90, HR 78, T 96.8, Sat 98% on room air. Seen by cardiology who advised add on for cardiac cath. Cardiac cath performed and noted triple vessel disease. Patient to be admitted to the CCU for CT surgery workup and inpatient surgery.  (2021 21:35)      INTERVAL HISTORY:  Brought in overnight, cath showed triple vessel disease w/ 99% stenosis of RCA  did have more episodes of cp overnight    PAST MEDICAL & SURGICAL HISTORY  Dyslipidemia    Hypertension    No significant past surgical history        ALLERGIES:  No Known Allergies      MEDICATIONS:  MEDICATIONS  (STANDING):  aspirin  chewable 81 milliGRAM(s) Oral daily  atorvastatin 80 milliGRAM(s) Oral at bedtime  chlorhexidine 4% Liquid 1 Application(s) Topical <User Schedule>  heparin  Infusion 1300 Unit(s)/Hr (13 mL/Hr) IV Continuous <Continuous>  isosorbide   mononitrate ER Tablet (IMDUR) 30 milliGRAM(s) Oral daily  melatonin 10 milliGRAM(s) Oral at bedtime  metoprolol succinate ER 25 milliGRAM(s) Oral daily  sodium chloride 0.9% Bolus 300 milliLiter(s) IV Bolus once  sodium chloride 0.9%. 1000 milliLiter(s) (75 mL/Hr) IV Continuous <Continuous>    MEDICATIONS  (PRN):      HOME MEDICATIONS:  Home Medications:  amLODIPine 5 mg oral tablet: 1 tab(s) orally once a day (2021 21:35)  Bystolic 20 mg oral tablet: 1 tab(s) orally once a day (2021 21:35)  isosorbide mononitrate 30 mg oral tablet, extended release: 1 tab(s) orally once a day (in the morning) (2021 21:35)  rosuvastatin 5 mg oral tablet: 1 tab(s) orally once a day (2021 21:35)        OBJECTIVE:  ICU Vital Signs Last 24 Hrs  T(C): 36.6 (2021 04:00), Max: 36.6 (2021 00:00)  T(F): 97.8 (2021 04:00), Max: 97.8 (2021 00:00)  HR: 82 (2021 06:00) (56 - 82)  BP: 133/89 (2021 06:00) (90/60 - 148/90)  BP(mean): 107 (2021 06:00) (68 - 108)  ABP: --  ABP(mean): --  RR: 26 (2021 06:00) (14 - 26)  SpO2: 97% (2021 06:00) (96% - 100%)      Adult Advanced Hemodynamics Last 24 Hrs  CVP(mm Hg): --  CVP(cm H2O): --  CO: --  CI: --  PA: --  PA(mean): --  PCWP: --  SVR: --  SVRI: --  PVR: --  PVRI: --  I&O's Summary    2021 07:01  -  2021 07:00  --------------------------------------------------------  IN: 1006 mL / OUT: 1500 mL / NET: -494 mL      Daily Height in cm: 172.72 (2021 23:00)    Daily Weight in k (2021 06:00)    PHYSICAL EXAM:  NEURO: patient is awake , alert and oriented  GEN: Not in acute distress  NECK: no thyroid enlargement, no JVD  LUNGS: Clear to auscultation bilaterally   CARDIOVASCULAR: S1/S2 present, RRR ,   ABD: Soft, non-tender, non-distended, +BS  EXT: No MARLON  SKIN: Intact  ACCESS Site:    LABS:                        14.2   6.44  )-----------( 193      ( 2021 04:37 )             41.9         138  |  102  |  16  ----------------------------<  102<H>  4.0   |  22  |  1.0    Ca    9.4      2021 04:37  Mg     2.0         TPro  8.0  /  Alb  4.9  /  TBili  1.0  /  DBili  x   /  AST  33  /  ALT  34  /  AlkPhos  45  11    PT/INR - ( 2021 15:49 )   PT: 12.70 sec;   INR: 1.10 ratio         PTT - ( 2021 15:49 )  PTT:36.7 sec  Troponin T, Serum: <0.01 ng/mL (21 @ 15:49)    CARDIAC MARKERS ( 2021 15:49 )  x     / <0.01 ng/mL / x     / x     / x            Troponin trend:    Serum Pro-Brain Natriuretic Peptide: 98 pg/mL (21 @ 15:49)     Chol 123 LDL -- HDL 45 Trig 44      TELEMETRY EVENTS:  none       HPI:  This is a 56 year old male with PMHx of HTN, DLD, and ?Prediabetes who presented to the ED with chest pain. As per the patient reports that he had an exercise stress test done recently that was positive for symptoms and ekg changes following moderate workload. Imaging noted ischemia in the RCA area as well as hypokinesis and decreased thickening of the inferior wall. He notes that for a few months since his father passed away he has been having some stable angina. Reported exertional chest tightness alleviated with rest. He was being worked up by Dr. Weinstein with elective cath schedueld for 2021. However he noted on the day of presentation that he was lifting something heavy and he had further episode of chest tightness/pain. He had been advised to go to the ED if he had further episodes of chest pain so that is what he did.     In the ED initial vitals: /90, HR 78, T 96.8, Sat 98% on room air. Seen by cardiology who advised add on for cardiac cath. Cardiac cath performed and noted triple vessel disease. Patient to be admitted to the CCU for CT surgery workup and inpatient surgery.  (2021 21:35)        INTERVAL HISTORY:  Brought in overnight, cath showed triple vessel disease w/ 99% stenosis of RCA  did have more episodes of cp overnight    PAST MEDICAL & SURGICAL HISTORY  Dyslipidemia    Hypertension    No significant past surgical history        ALLERGIES:  No Known Allergies      MEDICATIONS:  MEDICATIONS  (STANDING):  aspirin  chewable 81 milliGRAM(s) Oral daily  atorvastatin 80 milliGRAM(s) Oral at bedtime  chlorhexidine 4% Liquid 1 Application(s) Topical <User Schedule>  heparin  Infusion 1300 Unit(s)/Hr (13 mL/Hr) IV Continuous <Continuous>  isosorbide   mononitrate ER Tablet (IMDUR) 30 milliGRAM(s) Oral daily  melatonin 10 milliGRAM(s) Oral at bedtime  metoprolol succinate ER 25 milliGRAM(s) Oral daily  sodium chloride 0.9% Bolus 300 milliLiter(s) IV Bolus once  sodium chloride 0.9%. 1000 milliLiter(s) (75 mL/Hr) IV Continuous <Continuous>    MEDICATIONS  (PRN):      HOME MEDICATIONS:  Home Medications:  amLODIPine 5 mg oral tablet: 1 tab(s) orally once a day (2021 21:35)  Bystolic 20 mg oral tablet: 1 tab(s) orally once a day (2021 21:35)  isosorbide mononitrate 30 mg oral tablet, extended release: 1 tab(s) orally once a day (in the morning) (2021 21:35)  rosuvastatin 5 mg oral tablet: 1 tab(s) orally once a day (2021 21:35)        OBJECTIVE:  ICU Vital Signs Last 24 Hrs  T(C): 36.6 (2021 04:00), Max: 36.6 (2021 00:00)  T(F): 97.8 (2021 04:00), Max: 97.8 (2021 00:00)  HR: 82 (2021 06:00) (56 - 82)  BP: 133/89 (2021 06:00) (90/60 - 148/90)  BP(mean): 107 (2021 06:00) (68 - 108)  ABP: --  ABP(mean): --  RR: 26 (2021 06:00) (14 - 26)  SpO2: 97% (2021 06:00) (96% - 100%)      Adult Advanced Hemodynamics Last 24 Hrs  CVP(mm Hg): --  CVP(cm H2O): --  CO: --  CI: --  PA: --  PA(mean): --  PCWP: --  SVR: --  SVRI: --  PVR: --  PVRI: --  I&O's Summary    2021 07:01  -  2021 07:00  --------------------------------------------------------  IN: 1006 mL / OUT: 1500 mL / NET: -494 mL      Daily Height in cm: 172.72 (2021 23:00)    Daily Weight in k (2021 06:00)    PHYSICAL EXAM:  NEURO: patient is awake , alert and oriented  GEN: Not in acute distress  NECK: no thyroid enlargement, no JVD  LUNGS: Clear to auscultation bilaterally   CARDIOVASCULAR: S1/S2 present, RRR ,   ABD: Soft, non-tender, non-distended, +BS  EXT: No MARLON  SKIN: Intact  ACCESS Site:    LABS:                        14.2   6.44  )-----------( 193      ( 2021 04:37 )             41.9         138  |  102  |  16  ----------------------------<  102<H>  4.0   |  22  |  1.0    Ca    9.4      2021 04:37  Mg     2.0         TPro  8.0  /  Alb  4.9  /  TBili  1.0  /  DBili  x   /  AST  33  /  ALT  34  /  AlkPhos  45  11    PT/INR - ( 2021 15:49 )   PT: 12.70 sec;   INR: 1.10 ratio         PTT - ( 2021 15:49 )  PTT:36.7 sec  Troponin T, Serum: <0.01 ng/mL (21 @ 15:49)    CARDIAC MARKERS ( 2021 15:49 )  x     / <0.01 ng/mL / x     / x     / x            Troponin trend:    Serum Pro-Brain Natriuretic Peptide: 98 pg/mL (21 @ 15:49)     Chol 123 LDL -- HDL 45 Trig 44      TELEMETRY EVENTS:  none

## 2021-02-12 NOTE — PRE-ANESTHESIA EVALUATION ADULT - NS MD HP INPLANTS MED DEV
41.1 weeks male born to a 33 year old  O+ mother via CS. Maternal history significant for HSV+ on Valtrex. No significant prenatal history. PNL negative/nonreactive/immune. GBS negative on . SROM at 1253 on , ~15 hours prior to delivery Induction due to postdates, vancomycin and gentamicin given 1 hour prior to delivery (penacilin allergy). Peds called for CS due to NRFHT. Terminal meconium noted when baby emerged. Baby spontaneously cried and had active motion. 1 minute delayed cord clamping was performed. WDSS. At 5 MOL, baby noted to be pale with nasal flaring, O2 sat 65%. Nasopharyngeal suction performed and CPAP 5/30% was started. Additionally, maternal temperature noted of 38.2 C. EOS 1.31. Baby transferred to NICU for CPAP and sepsis workup. Mother consents to breast/bottle feeding, HepB, and circ.     NICU COURSE:   Resp:  Remained on CPAP 5/21%. CXR consistent with TTN. Trialed off on DOL#0 and remains stable in room air.  ID:  CBC on admission unremarkable. No risk factors for sepsis.  Cardio:  Hemodynamically stable.  Heme:  Admission CBC unremarkable. Blood type O-. Pacheco negative   FEN/GI:  Initially NPO on IVF. Enteral feeds started on DOL# 0 and now tolerating PO ad jaskaran feeds of expressed breastmilk and/or Similac Advance. Dsticks remain stable. None 41.1 weeks male born to a 33 year old  O+ mother via CS. Maternal history significant for HSV+ on Valtrex. No significant prenatal history. PNL negative/nonreactive/immune. GBS negative on . SROM at 1253 on , ~15 hours prior to delivery Induction due to postdates, vancomycin and gentamicin given 1 hour prior to delivery (penacilin allergy). Peds called for CS due to NRFHT. Terminal meconium noted when baby emerged. Baby spontaneously cried and had active motion. 1 minute delayed cord clamping was performed. WDSS. At 5 MOL, baby noted to be pale with nasal flaring, O2 sat 65%. Nasopharyngeal suction performed and CPAP 5/30% was started. Additionally, maternal temperature noted of 38.2 C. EOS 1.31. Baby transferred to NICU for CPAP and sepsis workup. Mother consents to breast/bottle feeding, HepB, and circ.     NICU COURSE:   Resp:  Remained on CPAP 5/21%. CXR consistent with TTN. Trialed off on DOL#0 and remains stable in room air.  ID:  CBC on admission unremarkable. No risk factors for sepsis.  Cardio:  Hemodynamically stable.  Heme:  Admission CBC unremarkable. Blood type O-. Pacheco negative   FEN/GI:  Initially NPO on IVF. Enteral feeds started on DOL# 0 and now tolerating PO ad jaskaran feeds of expressed breastmilk and/or Similac Advance. Dsticks remain stable.    Since admission to NBN, baby has been feeding well, stooling, and making adequate wet diapers. Vitals have remained stable. Baby received routine NBN care. Bilirubin was 5.0  at 39  hours of life, which is low  risk zone. The baby lost an acceptable amount of weight during the nursery stay, down 4.2 % from birth weight.    .See below for CCHD, auditory screening, and Hepatitis B vaccine status.  Patient is stable for discharge to home after receiving routine  care education and instructions to follow up with pediatrician appointment in 1-2 days. 41.1 weeks male born to a 33 year old  O+ mother via CS. Maternal history significant for HSV+ on Valtrex. No significant prenatal history. PNL negative/nonreactive/immune. GBS negative on . SROM at 1253 on , ~15 hours prior to delivery Induction due to postdates, vancomycin and gentamicin given 1 hour prior to delivery (penacilin allergy). Peds called for CS due to NRFHT. Terminal meconium of no clinical significance noted when baby emerged. Baby spontaneously cried and had active motion. 1 minute delayed cord clamping was performed. WDSS. At 5 MOL, baby noted to be pale with nasal flaring, O2 sat 65%. Nasopharyngeal suction performed and CPAP 5/30% was started. Additionally, maternal temperature noted of 38.2 C. EOS 1.31. Baby transferred to NICU for CPAP and sepsis workup. Mother consents to breast/bottle feeding, HepB, and circ.     NICU COURSE:   Resp:  Remained on CPAP 5/21%. CXR consistent with TTN. Trialed off on DOL#0 and remains stable in room air.  ID:  CBC on admission unremarkable. No risk factors for sepsis.  Cardio:  Hemodynamically stable.  Heme:  Admission CBC unremarkable. Blood type O-. Pacheco negative   FEN/GI:  Initially NPO on IVF. Enteral feeds started on DOL# 0 and now tolerating PO ad jaskaran feeds of expressed breastmilk and/or Similac Advance. Dsticks remain stable.    Since admission to NBN, baby has been feeding well, stooling, and making adequate wet diapers. Vitals have remained stable. Baby received routine NBN care. Bilirubin was 5.0  at 39  hours of life, which is low  risk zone. The baby lost an acceptable amount of weight during the nursery stay, down 4.2 % from birth weight.    .See below for CCHD, auditory screening, and Hepatitis B vaccine status.  Patient is stable for discharge to home after receiving routine  care education and instructions to follow up with pediatrician appointment in 1-2 days.    Pediatric Attending Addendum:  I have read and agree with above PGY1 Discharge Note except for any changes detailed below.   I have spent > 30 minutes with the patient and the patient's family on direct patient care and discharge planning.  Discharge note will be faxed to appropriate outpatient pediatrician.  Plan to follow-up per above.  Please see above weight and bilirubin.     Discharge Exam:  GEN: NAD alert active  HEENT:  AFOF, +RR b/l, MMM  CHEST: nml s1/s2, RRR, no murmur, lungs cta b/l  Abd: soft/nt/nd +bs no hsm  umbilical stump c/d/i  Hips: neg Ortolani/Black  : testes palpated b/l  Neuro: +grasp/suck/franco  Skin: no abnormal rash    Well  born via ; s/p NICU for now resolved TTN and observation and evaluation for sepsis due to maternal fever during labor with EOS score >1; negative blood culture and vitals within normal limits; stable for discharge home; Discharge home with pediatrician follow-up in 1-2 days; Mother educated about jaundice, importance of baby feeding well, monitoring wet diapers and stools and following up with pediatrician; She expressed understanding;      Trinity Carson MD

## 2021-02-12 NOTE — DISCHARGE NOTE PROVIDER - PROVIDER TOKENS
PROVIDER:[TOKEN:[[id number]],FOLLOWUP:[1 week]],PROVIDER:[TOKEN:[[id number]],FOLLOWUP:[1 month]] PROVIDER:[TOKEN:[[id number]],SCHEDULEDAPPT:[02/22/2021],SCHEDULEDAPPTTIME:[02:00 PM]],PROVIDER:[TOKEN:[[id number]],FOLLOWUP:[1 month],SCHEDULEDAPPTTIME:[02:30 PM]]

## 2021-02-12 NOTE — DISCHARGE NOTE PROVIDER - NSDCFUADDINST_GEN_ALL_CORE_FT
Activities/Restrictions  1.	Do not – drive, lift, pull or push anything over 10 pounds for 8 weeks.  2.	Shower every night and carefully wash wound, pat dry.  Cover is wound is draining with dry sterile dressing. No baths or swimming for two months.   3.	Apply support stockings /ace wraps to legs as soon as you get out of bed in the morning, remove in evening.   4.	Do progressive walking exercises every day, gradually increasing to 30 to 40 minutes/day, five days a week and incentive spirometer 10 times every hour while awake  5.	DO NOT DRIVE OR CONSUME ALCOHOL WHILE TAKING PAIN MEDICATION.  Contact your Physician promptly if:  1.	 Signs of wound infection, such as increasing redness, swelling, pain or drainage from incision.  2.	Progressive shortness of breath or increasing difficulty with breathing when lying down.  3.	Excessive nausea, vomiting, diarrhea or coughing.  4.	Increase swelling of legs that does not resolve with leg elevation.  5.	Chest pain that spreads to arms, jaw or back or sudden development of numbness, weakness, difficulty speaking or facial droop – Call 911.  6.	Diabetics who are unable to keep finger stick glucose under 150 for three consecutive readings.  Instructions:  1.	 Keep a daily log for Temperature, pulse, blood pressure, and weight twice a day and Glucose if diabetic with each meal. Call office for Temp > 101, pulse greater than 110 or less than 55, BP first # greater than 160 or less than 100, 3 pound weight gain in 1 day or 5 pounds in 3 days  2.	Hold pillow to chest and grab elbows when you need to cough.

## 2021-02-12 NOTE — DISCHARGE NOTE PROVIDER - HOSPITAL COURSE
This is a 56 year old male non-smoker with PMHx of HTN, DLD, and Prediabetes who presented to the ED with chest pain on exertion. He had an exercise stress test done recently that was positive for symptoms and ekg changes following moderate workload. Imaging noted ischemia in the RCA area as well as hypokinesis and decreased thickening of the inferior wall. He notes that for a few months since his father passed away he has been having some stable angina. Reported exertional chest tightness alleviated with rest. He was being worked up by Dr. Weinstein with elective cath scheduled for 2/16/2021. However he was lifting something heavy and he had further episode of chest tightness/pain. He was advised to go to the ED if he had further episodes of chest pain so that is what he did.     In the ED initial vitals: /90, HR 78, T 96.8, Sat 98% on room air. Seen by cardiology who advised add on for cardiac cath. Cardiac cath performed and noted triple vessel disease. Patient was admitted to the CCU for CT surgery workup and inpatient surgery.  When medically optimized he underwent myocardial revascularization via CABG on 02/12/21.  His post-op course was This is a 56 year old male non-smoker with PMHx of HTN, DLD, and Prediabetes who presented to the ED with chest pain on exertion. He had an exercise stress test done recently that was positive for symptoms and ekg changes following moderate workload. Imaging noted ischemia in the RCA area as well as hypokinesis and decreased thickening of the inferior wall. He notes that for a few months since his father passed away he has been having some worsening angina. Reported exertional chest tightness alleviated with rest. He was being worked up by Dr. Weinstein with elective cath scheduled for 2/16/2021. However he was lifting something heavy and he had further episode of chest tightness/pain. He was advised to go to the ED if he had further episodes of chest pain so that is what he did. Admitted with acute coronary syndrome.    In the ED initial vitals: /90, HR 78, T 96.8, Sat 98% on room air. Seen by cardiology who advised add on for cardiac cath. Cardiac cath performed and noted triple vessel disease. Patient was admitted to the CCU for CT surgery workup and inpatient surgery.  Was complaining of ongoing post cath angina. When medically optimized he underwent myocardial revascularization via CABG on 02/12/21.  His post-op course was This is a 56 year old male non-smoker with PMHx of HTN, DLD, and Prediabetes who presented to the ED with chest pain on exertion. He had an exercise stress test done recently that was positive for symptoms and ekg changes following moderate workload. Imaging noted ischemia in the RCA area as well as hypokinesis and decreased thickening of the inferior wall. He notes that for a few months since his father passed away he has been having some worsening angina. Reported exertional chest tightness alleviated with rest. He was being worked up by Dr. Weinstein with elective cath scheduled for 2/16/2021. However he was lifting something heavy and he had further episode of chest tightness/pain. He was advised to go to the ED if he had further episodes of chest pain so that is what he did. Admitted with acute coronary syndrome.    In the ED initial vitals: /90, HR 78, T 96.8, Sat 98% on room air. Seen by cardiology who advised add on for cardiac cath. Cardiac cath performed and noted triple vessel disease. Patient was admitted to the CCU for CT surgery workup and inpatient surgery.  Was complaining of ongoing post cath angina. When medically optimized he underwent myocardial revascularization via CABG on 02/12/21.    Patient had an uneventful post-operative course. He was discharged home in stable condition on POD#4 with instructions to follow up with Dr. Mantilla on 2/22/2021 at 2:30pm.

## 2021-02-13 LAB
ALBUMIN SERPL ELPH-MCNC: 4 G/DL — SIGNIFICANT CHANGE UP (ref 3.5–5.2)
ALP SERPL-CCNC: 29 U/L — LOW (ref 30–115)
ALT FLD-CCNC: 17 U/L — SIGNIFICANT CHANGE UP (ref 0–41)
ANION GAP SERPL CALC-SCNC: 9 MMOL/L — SIGNIFICANT CHANGE UP (ref 7–14)
APTT BLD: 43.7 SEC — HIGH (ref 27–39.2)
AST SERPL-CCNC: 45 U/L — HIGH (ref 0–41)
BASOPHILS # BLD AUTO: 0.02 K/UL — SIGNIFICANT CHANGE UP (ref 0–0.2)
BASOPHILS NFR BLD AUTO: 0.3 % — SIGNIFICANT CHANGE UP (ref 0–1)
BILIRUB SERPL-MCNC: 1 MG/DL — SIGNIFICANT CHANGE UP (ref 0.2–1.2)
BUN SERPL-MCNC: 14 MG/DL — SIGNIFICANT CHANGE UP (ref 10–20)
CALCIUM SERPL-MCNC: 7.9 MG/DL — LOW (ref 8.5–10.1)
CHLORIDE SERPL-SCNC: 106 MMOL/L — SIGNIFICANT CHANGE UP (ref 98–110)
CO2 SERPL-SCNC: 21 MMOL/L — SIGNIFICANT CHANGE UP (ref 17–32)
CREAT SERPL-MCNC: 1.1 MG/DL — SIGNIFICANT CHANGE UP (ref 0.7–1.5)
EOSINOPHIL # BLD AUTO: 0.02 K/UL — SIGNIFICANT CHANGE UP (ref 0–0.7)
EOSINOPHIL NFR BLD AUTO: 0.3 % — SIGNIFICANT CHANGE UP (ref 0–8)
GLUCOSE BLDC GLUCOMTR-MCNC: 102 MG/DL — HIGH (ref 70–99)
GLUCOSE BLDC GLUCOMTR-MCNC: 104 MG/DL — HIGH (ref 70–99)
GLUCOSE BLDC GLUCOMTR-MCNC: 107 MG/DL — HIGH (ref 70–99)
GLUCOSE BLDC GLUCOMTR-MCNC: 179 MG/DL — HIGH (ref 70–99)
GLUCOSE BLDC GLUCOMTR-MCNC: 87 MG/DL — SIGNIFICANT CHANGE UP (ref 70–99)
GLUCOSE SERPL-MCNC: 178 MG/DL — HIGH (ref 70–99)
HCT VFR BLD CALC: 32.1 % — LOW (ref 42–52)
HGB BLD-MCNC: 11 G/DL — LOW (ref 14–18)
IMM GRANULOCYTES NFR BLD AUTO: 0.7 % — HIGH (ref 0.1–0.3)
INR BLD: 1.36 RATIO — HIGH (ref 0.65–1.3)
LYMPHOCYTES # BLD AUTO: 1.24 K/UL — SIGNIFICANT CHANGE UP (ref 1.2–3.4)
LYMPHOCYTES # BLD AUTO: 17.7 % — LOW (ref 20.5–51.1)
MAGNESIUM SERPL-MCNC: 2.5 MG/DL — HIGH (ref 1.8–2.4)
MCHC RBC-ENTMCNC: 30.1 PG — SIGNIFICANT CHANGE UP (ref 27–31)
MCHC RBC-ENTMCNC: 34.3 G/DL — SIGNIFICANT CHANGE UP (ref 32–37)
MCV RBC AUTO: 87.9 FL — SIGNIFICANT CHANGE UP (ref 80–94)
MONOCYTES # BLD AUTO: 0.59 K/UL — SIGNIFICANT CHANGE UP (ref 0.1–0.6)
MONOCYTES NFR BLD AUTO: 8.4 % — SIGNIFICANT CHANGE UP (ref 1.7–9.3)
NEUTROPHILS # BLD AUTO: 5.07 K/UL — SIGNIFICANT CHANGE UP (ref 1.4–6.5)
NEUTROPHILS NFR BLD AUTO: 72.6 % — SIGNIFICANT CHANGE UP (ref 42.2–75.2)
NRBC # BLD: 0 /100 WBCS — SIGNIFICANT CHANGE UP (ref 0–0)
PLATELET # BLD AUTO: 121 K/UL — LOW (ref 130–400)
POTASSIUM SERPL-MCNC: 4.4 MMOL/L — SIGNIFICANT CHANGE UP (ref 3.5–5)
POTASSIUM SERPL-SCNC: 4.4 MMOL/L — SIGNIFICANT CHANGE UP (ref 3.5–5)
PROT SERPL-MCNC: 5.5 G/DL — LOW (ref 6–8)
PROTHROM AB SERPL-ACNC: 15.6 SEC — HIGH (ref 9.95–12.87)
RBC # BLD: 3.65 M/UL — LOW (ref 4.7–6.1)
RBC # FLD: 13.2 % — SIGNIFICANT CHANGE UP (ref 11.5–14.5)
SODIUM SERPL-SCNC: 136 MMOL/L — SIGNIFICANT CHANGE UP (ref 135–146)
WBC # BLD: 6.99 K/UL — SIGNIFICANT CHANGE UP (ref 4.8–10.8)
WBC # FLD AUTO: 6.99 K/UL — SIGNIFICANT CHANGE UP (ref 4.8–10.8)

## 2021-02-13 PROCEDURE — 71045 X-RAY EXAM CHEST 1 VIEW: CPT | Mod: 26

## 2021-02-13 PROCEDURE — 99233 SBSQ HOSP IP/OBS HIGH 50: CPT

## 2021-02-13 PROCEDURE — 93010 ELECTROCARDIOGRAM REPORT: CPT

## 2021-02-13 RX ORDER — INSULIN LISPRO 100/ML
VIAL (ML) SUBCUTANEOUS
Refills: 0 | Status: DISCONTINUED | OUTPATIENT
Start: 2021-02-13 | End: 2021-02-15

## 2021-02-13 RX ORDER — HEPARIN SODIUM 5000 [USP'U]/ML
3000 INJECTION INTRAVENOUS; SUBCUTANEOUS EVERY 12 HOURS
Refills: 0 | Status: DISCONTINUED | OUTPATIENT
Start: 2021-02-13 | End: 2021-02-16

## 2021-02-13 RX ORDER — ALBUMIN HUMAN 25 %
500 VIAL (ML) INTRAVENOUS ONCE
Refills: 0 | Status: COMPLETED | OUTPATIENT
Start: 2021-02-13 | End: 2021-02-13

## 2021-02-13 RX ORDER — DEXTROSE 50 % IN WATER 50 %
15 SYRINGE (ML) INTRAVENOUS ONCE
Refills: 0 | Status: DISCONTINUED | OUTPATIENT
Start: 2021-02-13 | End: 2021-02-15

## 2021-02-13 RX ORDER — IPRATROPIUM/ALBUTEROL SULFATE 18-103MCG
3 AEROSOL WITH ADAPTER (GRAM) INHALATION EVERY 6 HOURS
Refills: 0 | Status: DISCONTINUED | OUTPATIENT
Start: 2021-02-13 | End: 2021-02-16

## 2021-02-13 RX ORDER — SODIUM CHLORIDE 9 MG/ML
1000 INJECTION, SOLUTION INTRAVENOUS
Refills: 0 | Status: DISCONTINUED | OUTPATIENT
Start: 2021-02-13 | End: 2021-02-16

## 2021-02-13 RX ORDER — FAMOTIDINE 10 MG/ML
20 INJECTION INTRAVENOUS
Refills: 0 | Status: DISCONTINUED | OUTPATIENT
Start: 2021-02-13 | End: 2021-02-16

## 2021-02-13 RX ORDER — DEXTROSE 50 % IN WATER 50 %
25 SYRINGE (ML) INTRAVENOUS ONCE
Refills: 0 | Status: DISCONTINUED | OUTPATIENT
Start: 2021-02-13 | End: 2021-02-15

## 2021-02-13 RX ORDER — GLUCAGON INJECTION, SOLUTION 0.5 MG/.1ML
1 INJECTION, SOLUTION SUBCUTANEOUS ONCE
Refills: 0 | Status: DISCONTINUED | OUTPATIENT
Start: 2021-02-13 | End: 2021-02-16

## 2021-02-13 RX ORDER — DEXTROSE 50 % IN WATER 50 %
12.5 SYRINGE (ML) INTRAVENOUS ONCE
Refills: 0 | Status: DISCONTINUED | OUTPATIENT
Start: 2021-02-13 | End: 2021-02-15

## 2021-02-13 RX ORDER — SENNA PLUS 8.6 MG/1
2 TABLET ORAL AT BEDTIME
Refills: 0 | Status: DISCONTINUED | OUTPATIENT
Start: 2021-02-13 | End: 2021-02-15

## 2021-02-13 RX ADMIN — Medication 400 MILLIGRAM(S): at 17:12

## 2021-02-13 RX ADMIN — Medication 250 MILLILITER(S): at 08:00

## 2021-02-13 RX ADMIN — Medication 400 MILLIGRAM(S): at 23:41

## 2021-02-13 RX ADMIN — Medication 250 MILLIGRAM(S): at 11:45

## 2021-02-13 RX ADMIN — Medication 250 MILLIGRAM(S): at 23:09

## 2021-02-13 RX ADMIN — Medication 250 MILLIGRAM(S): at 00:32

## 2021-02-13 RX ADMIN — HEPARIN SODIUM 3000 UNIT(S): 5000 INJECTION INTRAVENOUS; SUBCUTANEOUS at 17:12

## 2021-02-13 RX ADMIN — Medication 325 MILLIGRAM(S): at 12:47

## 2021-02-13 RX ADMIN — FAMOTIDINE 20 MILLIGRAM(S): 10 INJECTION INTRAVENOUS at 17:13

## 2021-02-13 RX ADMIN — Medication 5 MILLIGRAM(S): at 17:12

## 2021-02-13 RX ADMIN — FAMOTIDINE 20 MILLIGRAM(S): 10 INJECTION INTRAVENOUS at 05:10

## 2021-02-13 RX ADMIN — SENNA PLUS 2 TABLET(S): 8.6 TABLET ORAL at 22:29

## 2021-02-13 RX ADMIN — OXYCODONE HYDROCHLORIDE 5 MILLIGRAM(S): 5 TABLET ORAL at 09:38

## 2021-02-13 RX ADMIN — CHLORHEXIDINE GLUCONATE 5 MILLILITER(S): 213 SOLUTION TOPICAL at 05:11

## 2021-02-13 RX ADMIN — Medication 400 MILLIGRAM(S): at 11:45

## 2021-02-13 RX ADMIN — Medication 100 MILLIGRAM(S): at 00:01

## 2021-02-13 RX ADMIN — Medication 100 MILLIGRAM(S): at 16:42

## 2021-02-13 RX ADMIN — POLYETHYLENE GLYCOL 3350 17 GRAM(S): 17 POWDER, FOR SOLUTION ORAL at 11:45

## 2021-02-13 RX ADMIN — Medication 400 MILLIGRAM(S): at 05:10

## 2021-02-13 RX ADMIN — Medication 100 MILLIGRAM(S): at 22:29

## 2021-02-13 RX ADMIN — Medication 300 MILLIGRAM(S): at 04:06

## 2021-02-13 RX ADMIN — Medication 100 MILLIGRAM(S): at 09:15

## 2021-02-13 RX ADMIN — CHLORHEXIDINE GLUCONATE 1 APPLICATION(S): 213 SOLUTION TOPICAL at 05:15

## 2021-02-13 NOTE — PROGRESS NOTE ADULT - ASSESSMENT
PROBLEMS:  I spent 45 minutes of time examining patient, reviewing vitals, labs, medications, imaging and discussing with the team goals of care to prevent life-threatening in this patient who is at high risk for deterioration or death due to:      CAD - s/p CABG, , hold BB ( dobutrex ) hold statins ( transaminazemia ), wean of dobutrex, d/c sump and CT, a. line femoral   hyperglycemia - sliding scale  transaminazemia - hold statins  anemia - post op        Case and plan discussed with CT Surgeons and CTU PAs.  Continue CTU supportive care and ongoing plan of care as per continuing CTU rounds.   Continue DVT/GI prophylaxis.  Incentive Spirometry 10 times an hour.  Continue to advance physical activity as tolerated and continue PT/OT as directed.    PLAN  Neuro: move all 4 extremities. no sensory or motor deficits  Pain management.     Pulm: Wean off supplemental oxygen as able. Daily CXR. Encourage coughing, deep breathing and use of incentive spirometry.   albuterol/ipratropium for Nebulization 3 milliLiter(s) Nebulizer every 6 hours    Cardio: Monitor telemetry/alarms. Continue supportive care     GI: Continue stool softeners.    famotidine    Tablet 20 milliGRAM(s) Oral two times a day  senna 2 Tablet(s) Oral at bedtime    Nutrition: Continue present diet  Endocrine and glucose control:   dextrose 40% Gel 15 Gram(s) Oral once  dextrose 50% Injectable 25 Gram(s) IV Push once  dextrose 50% Injectable 12.5 Gram(s) IV Push once  dextrose 50% Injectable 25 Gram(s) IV Push once  glucagon  Injectable 1 milliGRAM(s) IntraMuscular once  insulin lispro (ADMELOG) corrective regimen sliding scale   SubCutaneous three times a day before meals    Renal: monitor urine output, supplement electrolytes as needed,     Vasc: Heparin SC and/or SCDs for DVT prophylaxis    ID: Stable, no fever , no chills. Off antibiotics.    Tubes: Monitor Chest tube output  Therapy: OOB/ambulate  Disposition: start planing discharge home or placement    Pertinent clinical, laboratory, radiographic, hemodynamic, echocardiographic, respiratory data, microbiologic data and chart were reviewed and analyzed frequently throughout the course of the day and night. GI and DVT prophylaxis, glycemic control, head of bed elevation and skin care issues were addressed.  Patient seen, examined and plan discussed with CT Surgery / CTICU team during rounds.    [ ] The patient remains in critical and unstable condition, and requires ICU care and monitoring  [x ] The patient is improving but requires continued monitoring and adjustment of therapy

## 2021-02-13 NOTE — PROGRESS NOTE ADULT - SUBJECTIVE AND OBJECTIVE BOX
OPERATIVE PROCEDURE(s):                POD #                       56yMale  SURGEON(s): JEFF Mantilla  SUBJECTIVE ASSESSMENT:   Vital Signs Last 24 Hrs  T(F): 98.8 (13 Feb 2021 05:30), Max: 99.1 (13 Feb 2021 03:30)  HR: 88 (13 Feb 2021 07:00) (66 - 88)  BP: 127/80 (12 Feb 2021 14:00) (103/68 - 140/90)  BP(mean): 99 (12 Feb 2021 14:00) (81 - 105)  ABP: 92/63 (13 Feb 2021 07:00) (-13/-13 - 166/89)  ABP(mean): 73 (13 Feb 2021 07:00)  RR: 23 (13 Feb 2021 07:00) (15 - 23)  SpO2: 99% (13 Feb 2021 07:00) (97% - 100%)  CVP(mm Hg): 12 (13 Feb 2021 07:00)  CVP(cm H2O): --  CO: 4.7 (13 Feb 2021 05:30)  CI: 2.1 (13 Feb 2021 05:30)  PA: 25/19 (13 Feb 2021 07:00)  SVR: 1122 (13 Feb 2021 05:30)  Mode: CPAP with PS  PEEP: 5  PS: 5    I&O's Detail    12 Feb 2021 07:01  -  13 Feb 2021 07:00  --------------------------------------------------------  IN:    Dexmedetomidine: 175 mL    DOBUTamine: 58.5 mL    Heparin: 13 mL    Insulin: 8 mL    IV PiggyBack: 400 mL    Nitroglycerin: 99 mL    sodium chloride 0.9%: 705 mL  Total IN: 1458.5 mL    OUT:    Chest Tube (mL): 130 mL    Chest Tube (mL): 65 mL    Indwelling Catheter - Urethral (mL): 1125 mL  Total OUT: 1320 mL        Net:   I&O's Detail    11 Feb 2021 07:01  -  12 Feb 2021 07:00  --------------------------------------------------------  Total NET: -494 mL      12 Feb 2021 07:01  -  13 Feb 2021 07:00  --------------------------------------------------------  Total NET: 138.5 mL        CAPILLARY BLOOD GLUCOSE      POCT Blood Glucose.: 179 mg/dL (13 Feb 2021 01:48)  POCT Blood Glucose.: 130 mg/dL (12 Feb 2021 23:31)    Physical Exam:  General: NAD; A&Ox3/Patient is intubated and sedated  Cardiac: S1/S2, RRR, no murmur, no rubs  Lungs: unlabored respirations, CTA b/l, no wheeze, no rales, no crackles  Abdomen: Soft/NT/ND; positive bowel sounds x 4  Sternum: Intact, no click, incision healing well with no drainage  Incisions: Incisions clean/dry/intact  Extremities: No edema b/l lower extremities; good capillary refill; no cyanosis; palpable 1+ pedal pulses b/l    Central Venous Catheter: Yes[]  No[] , If Yes indication:           Day #  Younger Catheter: Yes  [] , No  [] , If yes indication:                      Day #  NGT: Yes [] No [] ,    If Yes Placement:                                     Day #  EPICARDIAL WIRES:  [] YES [] NO                                              Day #  BOWEL MOVEMENT:  [] YES [] NO, If No, Timing since last BM:      Day #  CHEST TUBE(Left/Right):  [] YES [] NO, If yes -  AIR LEAKS:  [] YES [] NO        LABS:                        11.0<L>  6.99  )-----------( 121<L>    ( 13 Feb 2021 02:00 )             32.1<L>                        10.6<L>  7.47  )-----------( 118<L>    ( 12 Feb 2021 21:40 )             30.2<L>    02-13    136  |  106  |  14  ----------------------------<  178<H>  4.4   |  21  |  1.1  02-12    137  |  105  |  14  ----------------------------<  99  4.1   |  20  |  1.0    Ca    7.9<L>      13 Feb 2021 02:00  Mg     2.5     02-13    TPro  5.5<L> [6.0 - 8.0]  /  Alb  4.0 [3.5 - 5.2]  /  TBili  1.0 [0.2 - 1.2]  /  DBili  x   /  AST  45<H> [0 - 41]  /  ALT  17 [0 - 41]  /  AlkPhos  29<L> [30 - 115]  02-13    PT/INR - ( 13 Feb 2021 02:00 )   PT: ;   INR: 1.36 ratio         PT/INR - ( 12 Feb 2021 21:40 )   PT: ;   INR: 1.38 ratio         PTT - ( 13 Feb 2021 02:00 )  PTT:43.7 sec, PTT - ( 12 Feb 2021 21:40 )  PTT:29.5 sec    ABG - ( 13 Feb 2021 06:19 )  pH: 7.41  /  pCO2: 38    /  pO2: 133   / HCO3: 24    / Base Excess: -0.6  /  SaO2: 99    /  LA: 0.6              RADIOLOGY & ADDITIONAL TESTS:  CXR:  EKG:  MEDICATIONS  (STANDING):  acetaminophen  IVPB .. 1000 milliGRAM(s) IV Intermittent once  acetaminophen  IVPB .. 1000 milliGRAM(s) IV Intermittent once  acetaminophen  IVPB .. 1000 milliGRAM(s) IV Intermittent once  aspirin enteric coated 325 milliGRAM(s) Oral daily  bisacodyl 5 milliGRAM(s) Oral every 12 hours  ceFAZolin   IVPB 1000 milliGRAM(s) IV Intermittent every 8 hours  chlorhexidine 0.12% Liquid 5 milliLiter(s) Oral Mucosa two times a day  chlorhexidine 0.12% Liquid 15 milliLiter(s) Oral Mucosa every 12 hours  chlorhexidine 4% Liquid 1 Application(s) Topical <User Schedule>  dexMEDEtomidine Infusion 0.25 MICROgram(s)/kG/Hr (6.82 mL/Hr) IV Continuous <Continuous>  dextrose 50% Injectable 50 milliLiter(s) IV Push every 15 minutes  dextrose 50% Injectable 25 milliLiter(s) IV Push every 15 minutes  DOBUTamine Infusion 2 MICROgram(s)/kG/Min (6.55 mL/Hr) IV Continuous <Continuous>  famotidine Injectable 20 milliGRAM(s) IV Push every 12 hours  insulin regular Infusion 1 Unit(s)/Hr (1 mL/Hr) IV Continuous <Continuous>  meperidine     Injectable 25 milliGRAM(s) IV Push once  niCARdipine Infusion 5 mG/Hr (25 mL/Hr) IV Continuous <Continuous>  nitroglycerin  Infusion 5 MICROgram(s)/Min (1.5 mL/Hr) IV Continuous <Continuous>  norepinephrine Infusion 0.05 MICROgram(s)/kG/Min (10.2 mL/Hr) IV Continuous <Continuous>  polyethylene glycol 3350 17 Gram(s) Oral daily  propofol Infusion 15 MICROgram(s)/kG/Min (9.82 mL/Hr) IV Continuous <Continuous>  sodium chloride 0.9% Bolus 300 milliLiter(s) IV Bolus once  sodium chloride 0.9%. 1000 milliLiter(s) (20 mL/Hr) IV Continuous <Continuous>  sodium chloride 0.9%. 1000 milliLiter(s) (75 mL/Hr) IV Continuous <Continuous>  vancomycin  IVPB 1000 milliGRAM(s) IV Intermittent every 12 hours    MEDICATIONS  (PRN):  fentaNYL    Injectable 50 MICROGram(s) IV Push once PRN Moderate Pain (4 - 6)  ondansetron  IVPB 4 milliGRAM(s) IV Intermittent once PRN Nausea and/or Vomiting  oxyCODONE    IR 5 milliGRAM(s) Oral every 4 hours PRN Moderate Pain (4 - 6)  oxyCODONE    IR 10 milliGRAM(s) Oral every 4 hours PRN Severe Pain (7 - 10)    HEPARIN:  [] YES [] NO  Dose: XX UNITS/HR UNITS Q8H  LOVENOX:[] YES [] NO  Dose: XX mg Q24H  COUMADIN: []  YES [] NO  Dose: XX mg  Q24H  SCD's: YES b/l  GI Prophylaxis: Protonix [], Pepcid [], None [], (Contra-indication:.....)    Post-Op Beta-Blockers: Yes [], No[], If No, then contraindication:  Post-Op Aspirin: Yes [],  No [], If No, then contraindication:  Post-Op Statin: Yes [], No[], If No, then contraindication:  Allergies    No Known Allergies    Intolerances      Ambulation/Activity Status:    Assessment/Plan:  56y Male status-post .....  - Case and plan discussed with CTU Intensivist and CT Surgeon - Dr. Higgins/Bambi/Jose Rafael  - Continue CTU supportive care    - Continue DVT/GI prophylaxis  - Incentive Spirometry 10 times an hour  - Continue to advance physical activity as tolerated and continue PT/OT as directed  1. CAD: Continue ASA, statin, BB  2. HTN:   3. A. Fib:   4. COPD/Hypoxia:   5. DM/Glucose Control:     Social Service Disposition:     OPERATIVE PROCEDURE(s):        CABGx3 L-Radial        POD #       1                56yMale  SURGEON(s): JEFF Mantilla  SUBJECTIVE ASSESSMENT: Patient reports no new complaints at this time.      Vital Signs Last 24 Hrs  T(F): 98.8 (13 Feb 2021 05:30), Max: 99.1 (13 Feb 2021 03:30)  HR: 88 (13 Feb 2021 07:00) (66 - 88)  BP: 127/80 (12 Feb 2021 14:00) (103/68 - 140/90)  BP(mean): 99 (12 Feb 2021 14:00) (81 - 105)  ABP: 92/63 (13 Feb 2021 07:00) (-13/-13 - 166/89)  ABP(mean): 73 (13 Feb 2021 07:00)  RR: 23 (13 Feb 2021 07:00) (15 - 23)  SpO2: 99% (13 Feb 2021 07:00) (97% - 100%)  CVP(mm Hg): 12 (13 Feb 2021 07:00)  CO: 4.7 (13 Feb 2021 05:30)  CI: 2.1 (13 Feb 2021 05:30)  PA: 25/19 (13 Feb 2021 07:00)  SVR: 1122 (13 Feb 2021 05:30)  Mode: CPAP with PS  PEEP: 5  PS: 5    I&O's Detail    12 Feb 2021 07:01  -  13 Feb 2021 07:00  --------------------------------------------------------  IN:    Dexmedetomidine: 175 mL    DOBUTamine: 58.5 mL    Heparin: 13 mL    Insulin: 8 mL    IV PiggyBack: 400 mL    Nitroglycerin: 99 mL    sodium chloride 0.9%: 705 mL  Total IN: 1458.5 mL    OUT:    Chest Tube (mL): 130 mL    Chest Tube (mL): 65 mL    Indwelling Catheter - Urethral (mL): 1125 mL  Total OUT: 1320 mL        Net:   I&O's Detail    11 Feb 2021 07:01  -  12 Feb 2021 07:00  --------------------------------------------------------  Total NET: -494 mL      12 Feb 2021 07:01  -  13 Feb 2021 07:00  --------------------------------------------------------  Total NET: 138.5 mL        CAPILLARY BLOOD GLUCOSE  POCT Blood Glucose.: 179 mg/dL (13 Feb 2021 01:48)  POCT Blood Glucose.: 130 mg/dL (12 Feb 2021 23:31)  A1C with Estimated Average Glucose Result: 6.0 (02.12.21 @ 12:14)     Physical Exam:  General: NAD; A&Ox3  Cardiac: +rubs, S1/S2, RRR, no murmur  Lungs: unlabored respirations, CTA b/l, no wheeze, no rales, no crackles  Abdomen: Soft/NT/ND; positive bowel sounds x 4  Sternum: Intact, no click, incision healing well with no drainage  Incisions: Incisions clean/dry/intact  Extremities: Trace edema b/l lower extremities; good capillary refill; no cyanosis; palpable 1+ pedal pulses b/l    Central Venous Catheter: Yes[x]  No[] , If Yes indication:           Day #  1  Younger Catheter: Yes  [x] , No  [] , If yes indication:                      Day #  1  NGT: Yes [] No [x] ,    If Yes Placement:                                     Day #  1  EPICARDIAL WIRES:  [x] YES [] NO                                              Day # 1  BOWEL MOVEMENT:  [] YES [x] NO, If No, Timing since last BM:      Day #  1  CHEST TUBE(Left/Right):  [x] YES [] NO, If yes -  AIR LEAKS:  [] YES [x] NO        LABS:                        11.0<L>  6.99  )-----------( 121<L>    ( 13 Feb 2021 02:00 )             32.1<L>                        10.6<L>  7.47  )-----------( 118<L>    ( 12 Feb 2021 21:40 )             30.2<L>    02-13    136  |  106  |  14  ----------------------------<  178<H>  4.4   |  21  |  1.1  02-12    137  |  105  |  14  ----------------------------<  99  4.1   |  20  |  1.0    Ca    7.9<L>      13 Feb 2021 02:00  Mg     2.5     02-13    TPro  5.5<L> [6.0 - 8.0]  /  Alb  4.0 [3.5 - 5.2]  /  TBili  1.0 [0.2 - 1.2]  /  DBili  x   /  AST  45<H> [0 - 41]  /  ALT  17 [0 - 41]  /  AlkPhos  29<L> [30 - 115]  02-13    PT/INR - ( 13 Feb 2021 02:00 )   PT: ;   INR: 1.36 ratio         PT/INR - ( 12 Feb 2021 21:40 )   PT: ;   INR: 1.38 ratio         PTT - ( 13 Feb 2021 02:00 )  PTT:43.7 sec, PTT - ( 12 Feb 2021 21:40 )  PTT:29.5 sec    ABG - ( 13 Feb 2021 06:19 )  pH: 7.41  /  pCO2: 38    /  pO2: 133   / HCO3: 24    / Base Excess: -0.6  /  SaO2: 99    /  LA: 0.6            RADIOLOGY & ADDITIONAL TESTS:  CXR:  < from: Xray Chest 1 View-PORTABLE IMMEDIATE (Xray Chest 1 View-PORTABLE IMMEDIATE .) (02.12.21 @ 22:39) >    Impression:    Interval median sternotomy.    No pneumothorax.    Interval intubation with distal tip of endotracheal tube approximately 5 cm above kong. Enteric tube extends below the diaphragm. Post 2 left-sided chest tube placement. Stable right [place]-Zacarias catheter. Tube overlies mediastinum.      EKG:  < from: 12 Lead ECG (02.13.21 @ 08:15) >    Ventricular Rate 81 BPM    Atrial Rate 81 BPM    P-R Interval 148 ms    QRS Duration 132 ms    Q-T Interval 398 ms    QTC Calculation(Bazett) 462 ms    P Axis 35 degrees    R Axis 24 degrees    T Axis -12 degrees    Diagnosis Line *** Poor data quality, interpretation may be adversely affected  Normal sinus rhythm  Non-specific intra-ventricular conduction block  ST elevation, consider early repolarization, pericarditis, or injury  T wave abnormality, consider inferior ischemia  Abnormal ECG      MEDICATIONS  (STANDING):  acetaminophen  IVPB .. 1000 milliGRAM(s) IV Intermittent once  acetaminophen  IVPB .. 1000 milliGRAM(s) IV Intermittent once  acetaminophen  IVPB .. 1000 milliGRAM(s) IV Intermittent once  aspirin enteric coated 325 milliGRAM(s) Oral daily  bisacodyl 5 milliGRAM(s) Oral every 12 hours  ceFAZolin   IVPB 1000 milliGRAM(s) IV Intermittent every 8 hours  chlorhexidine 0.12% Liquid 5 milliLiter(s) Oral Mucosa two times a day  chlorhexidine 0.12% Liquid 15 milliLiter(s) Oral Mucosa every 12 hours  chlorhexidine 4% Liquid 1 Application(s) Topical <User Schedule>  dexMEDEtomidine Infusion 0.25 MICROgram(s)/kG/Hr (6.82 mL/Hr) IV Continuous <Continuous>  dextrose 50% Injectable 50 milliLiter(s) IV Push every 15 minutes  dextrose 50% Injectable 25 milliLiter(s) IV Push every 15 minutes  DOBUTamine Infusion 2 MICROgram(s)/kG/Min (6.55 mL/Hr) IV Continuous <Continuous>  famotidine Injectable 20 milliGRAM(s) IV Push every 12 hours  insulin regular Infusion 1 Unit(s)/Hr (1 mL/Hr) IV Continuous <Continuous>  meperidine     Injectable 25 milliGRAM(s) IV Push once  niCARdipine Infusion 5 mG/Hr (25 mL/Hr) IV Continuous <Continuous>  nitroglycerin  Infusion 5 MICROgram(s)/Min (1.5 mL/Hr) IV Continuous <Continuous>  norepinephrine Infusion 0.05 MICROgram(s)/kG/Min (10.2 mL/Hr) IV Continuous <Continuous>  polyethylene glycol 3350 17 Gram(s) Oral daily  propofol Infusion 15 MICROgram(s)/kG/Min (9.82 mL/Hr) IV Continuous <Continuous>  sodium chloride 0.9% Bolus 300 milliLiter(s) IV Bolus once  sodium chloride 0.9%. 1000 milliLiter(s) (20 mL/Hr) IV Continuous <Continuous>  sodium chloride 0.9%. 1000 milliLiter(s) (75 mL/Hr) IV Continuous <Continuous>  vancomycin  IVPB 1000 milliGRAM(s) IV Intermittent every 12 hours    MEDICATIONS  (PRN):  fentaNYL    Injectable 50 MICROGram(s) IV Push once PRN Moderate Pain (4 - 6)  ondansetron  IVPB 4 milliGRAM(s) IV Intermittent once PRN Nausea and/or Vomiting  oxyCODONE    IR 5 milliGRAM(s) Oral every 4 hours PRN Moderate Pain (4 - 6)  oxyCODONE    IR 10 milliGRAM(s) Oral every 4 hours PRN Severe Pain (7 - 10)    HEPARIN:  [x] YES [] NO  Dose: 3,000 UNITS/HR UNITS Q8H  LOVENOX:[] YES [x] NO  Dose: XX mg Q24H  COUMADIN: []  YES [x] NO  Dose: XX mg  Q24H  SCD's: YES b/l  GI Prophylaxis: Protonix [], Pepcid [x], None [], (Contra-indication:.....)    Post-Op Beta-Blockers: Yes [], No[], If No, then contraindication:  Post-Op Calcium Channel Blockers: Yes [], No[], If No, then contraindication:  Post-Op Aspirin: Yes [x],  No [], If No, then contraindication:  Post-Op Statin: Yes [], No[], If No, then contraindication:    Allergies  No Known Allergies  Intolerances    Ambulation/Activity Status: ambulating as tolerated    Assessment/Plan:  56y Male status-post   CABGx3 L-Radial        POD #       1    - Case and plan discussed with CTU Intensivist and CT Surgeon - Dr. Mantilla  - Continue CTU supportive care    - Continue DVT/GI prophylaxis  - Incentive Spirometry 10 times an hour  - Continue to advance physical activity as tolerated and continue PT/OT as directed  1. CAD: Continue ASA, statin, BB and dobutamine   2. HTN:    3. DM/Glucose Control: Continue Insulin sliding scale     OPERATIVE PROCEDURE(s):        CABGx3 L-Radial        POD #       1                56yMale  SURGEON(s): JEFF Mantilla  SUBJECTIVE ASSESSMENT: Patient reports no new complaints at this time.      Vital Signs Last 24 Hrs  T(F): 98.8 (13 Feb 2021 05:30), Max: 99.1 (13 Feb 2021 03:30)  HR: 88 (13 Feb 2021 07:00) (66 - 88)  BP: 127/80 (12 Feb 2021 14:00) (103/68 - 140/90)  BP(mean): 99 (12 Feb 2021 14:00) (81 - 105)  ABP: 92/63 (13 Feb 2021 07:00) (-13/-13 - 166/89)  ABP(mean): 73 (13 Feb 2021 07:00)  RR: 23 (13 Feb 2021 07:00) (15 - 23)  SpO2: 99% (13 Feb 2021 07:00) (97% - 100%)  CVP(mm Hg): 12 (13 Feb 2021 07:00)  CO: 4.7 (13 Feb 2021 05:30)  CI: 2.1 (13 Feb 2021 05:30)  PA: 25/19 (13 Feb 2021 07:00)  SVR: 1122 (13 Feb 2021 05:30)  Mode: CPAP with PS  PEEP: 5  PS: 5    I&O's Detail    12 Feb 2021 07:01  -  13 Feb 2021 07:00  --------------------------------------------------------  IN:    Dexmedetomidine: 175 mL    DOBUTamine: 58.5 mL    Heparin: 13 mL    Insulin: 8 mL    IV PiggyBack: 400 mL    Nitroglycerin: 99 mL    sodium chloride 0.9%: 705 mL  Total IN: 1458.5 mL    OUT:    Chest Tube (mL): 130 mL    Chest Tube (mL): 65 mL    Indwelling Catheter - Urethral (mL): 1125 mL  Total OUT: 1320 mL        Net:   I&O's Detail    11 Feb 2021 07:01  -  12 Feb 2021 07:00  --------------------------------------------------------  Total NET: -494 mL      12 Feb 2021 07:01  -  13 Feb 2021 07:00  --------------------------------------------------------  Total NET: 138.5 mL        CAPILLARY BLOOD GLUCOSE  POCT Blood Glucose.: 179 mg/dL (13 Feb 2021 01:48)  POCT Blood Glucose.: 130 mg/dL (12 Feb 2021 23:31)  A1C with Estimated Average Glucose Result: 6.0 (02.12.21 @ 12:14)     Physical Exam:  General: NAD; A&Ox3  Cardiac: +rubs, S1/S2, RRR, no murmur  Lungs: unlabored respirations, CTA b/l, no wheeze, no rales, no crackles  Abdomen: Soft/NT/ND; positive bowel sounds x 4  Sternum: Intact, no click, incision healing well with no drainage  Incisions: Incisions clean/dry/intact  Extremities: Trace edema b/l lower extremities; good capillary refill; no cyanosis; palpable 1+ pedal pulses b/l    Central Venous Catheter: Yes[x]  No[] , If Yes indication:           Day #  1  Younger Catheter: Yes  [x] , No  [] , If yes indication:                      Day #  1  NGT: Yes [] No [x] ,    If Yes Placement:                                     Day #  1  EPICARDIAL WIRES:  [x] YES [] NO                                              Day # 1  BOWEL MOVEMENT:  [] YES [x] NO, If No, Timing since last BM:      Day #  1  CHEST TUBE(Left/Right):  [x] YES [] NO, If yes -  AIR LEAKS:  [] YES [x] NO        LABS:                        11.0<L>  6.99  )-----------( 121<L>    ( 13 Feb 2021 02:00 )             32.1<L>                        10.6<L>  7.47  )-----------( 118<L>    ( 12 Feb 2021 21:40 )             30.2<L>    02-13    136  |  106  |  14  ----------------------------<  178<H>  4.4   |  21  |  1.1  02-12    137  |  105  |  14  ----------------------------<  99  4.1   |  20  |  1.0    Ca    7.9<L>      13 Feb 2021 02:00  Mg     2.5     02-13    TPro  5.5<L> [6.0 - 8.0]  /  Alb  4.0 [3.5 - 5.2]  /  TBili  1.0 [0.2 - 1.2]  /  DBili  x   /  AST  45<H> [0 - 41]  /  ALT  17 [0 - 41]  /  AlkPhos  29<L> [30 - 115]  02-13    PT/INR - ( 13 Feb 2021 02:00 )   PT: ;   INR: 1.36 ratio         PT/INR - ( 12 Feb 2021 21:40 )   PT: ;   INR: 1.38 ratio         PTT - ( 13 Feb 2021 02:00 )  PTT:43.7 sec, PTT - ( 12 Feb 2021 21:40 )  PTT:29.5 sec    ABG - ( 13 Feb 2021 06:19 )  pH: 7.41  /  pCO2: 38    /  pO2: 133   / HCO3: 24    / Base Excess: -0.6  /  SaO2: 99    /  LA: 0.6            RADIOLOGY & ADDITIONAL TESTS:  CXR:  < from: Xray Chest 1 View-PORTABLE IMMEDIATE (Xray Chest 1 View-PORTABLE IMMEDIATE .) (02.12.21 @ 22:39) >    Impression:    Interval median sternotomy.    No pneumothorax.    Interval intubation with distal tip of endotracheal tube approximately 5 cm above kong. Enteric tube extends below the diaphragm. Post 2 left-sided chest tube placement. Stable right [place]-Zacarias catheter. Tube overlies mediastinum.      EKG:  < from: 12 Lead ECG (02.13.21 @ 08:15) >    Ventricular Rate 81 BPM    Atrial Rate 81 BPM    P-R Interval 148 ms    QRS Duration 132 ms    Q-T Interval 398 ms    QTC Calculation(Bazett) 462 ms    P Axis 35 degrees    R Axis 24 degrees    T Axis -12 degrees    Diagnosis Line *** Poor data quality, interpretation may be adversely affected  Normal sinus rhythm  Non-specific intra-ventricular conduction block  ST elevation, consider early repolarization, pericarditis, or injury  T wave abnormality, consider inferior ischemia  Abnormal ECG      MEDICATIONS  (STANDING):  acetaminophen  IVPB .. 1000 milliGRAM(s) IV Intermittent once  acetaminophen  IVPB .. 1000 milliGRAM(s) IV Intermittent once  acetaminophen  IVPB .. 1000 milliGRAM(s) IV Intermittent once  aspirin enteric coated 325 milliGRAM(s) Oral daily  bisacodyl 5 milliGRAM(s) Oral every 12 hours  ceFAZolin   IVPB 1000 milliGRAM(s) IV Intermittent every 8 hours  chlorhexidine 0.12% Liquid 5 milliLiter(s) Oral Mucosa two times a day  chlorhexidine 0.12% Liquid 15 milliLiter(s) Oral Mucosa every 12 hours  chlorhexidine 4% Liquid 1 Application(s) Topical <User Schedule>  dexMEDEtomidine Infusion 0.25 MICROgram(s)/kG/Hr (6.82 mL/Hr) IV Continuous <Continuous>  dextrose 50% Injectable 50 milliLiter(s) IV Push every 15 minutes  dextrose 50% Injectable 25 milliLiter(s) IV Push every 15 minutes  DOBUTamine Infusion 2 MICROgram(s)/kG/Min (6.55 mL/Hr) IV Continuous <Continuous>  famotidine Injectable 20 milliGRAM(s) IV Push every 12 hours  insulin regular Infusion 1 Unit(s)/Hr (1 mL/Hr) IV Continuous <Continuous>  meperidine     Injectable 25 milliGRAM(s) IV Push once  niCARdipine Infusion 5 mG/Hr (25 mL/Hr) IV Continuous <Continuous>  nitroglycerin  Infusion 5 MICROgram(s)/Min (1.5 mL/Hr) IV Continuous <Continuous>  norepinephrine Infusion 0.05 MICROgram(s)/kG/Min (10.2 mL/Hr) IV Continuous <Continuous>  polyethylene glycol 3350 17 Gram(s) Oral daily  propofol Infusion 15 MICROgram(s)/kG/Min (9.82 mL/Hr) IV Continuous <Continuous>  sodium chloride 0.9% Bolus 300 milliLiter(s) IV Bolus once  sodium chloride 0.9%. 1000 milliLiter(s) (20 mL/Hr) IV Continuous <Continuous>  sodium chloride 0.9%. 1000 milliLiter(s) (75 mL/Hr) IV Continuous <Continuous>  vancomycin  IVPB 1000 milliGRAM(s) IV Intermittent every 12 hours    MEDICATIONS  (PRN):  fentaNYL    Injectable 50 MICROGram(s) IV Push once PRN Moderate Pain (4 - 6)  ondansetron  IVPB 4 milliGRAM(s) IV Intermittent once PRN Nausea and/or Vomiting  oxyCODONE    IR 5 milliGRAM(s) Oral every 4 hours PRN Moderate Pain (4 - 6)  oxyCODONE    IR 10 milliGRAM(s) Oral every 4 hours PRN Severe Pain (7 - 10)    HEPARIN:  [x] YES [] NO  Dose: 3,000 UNITS/HR UNITS Q8H  LOVENOX:[] YES [x] NO  Dose: XX mg Q24H  COUMADIN: []  YES [x] NO  Dose: XX mg  Q24H  SCD's: YES b/l  GI Prophylaxis: Protonix [], Pepcid [x], None [], (Contra-indication:.....)    Post-Op Beta-Blockers: Yes [], No[x], If No, then contraindication: currently on pressors  Post-Op Calcium Channel Blockers: Yes [], No[x], If No, then contraindication: currently on pressors   Post-Op Aspirin: Yes [x],  No [], If No, then contraindication:  Post-Op Statin: Yes [], No[x], If No, then contraindication: elevated LFTs    Allergies  No Known Allergies  Intolerances    Ambulation/Activity Status: ambulating as tolerated    Assessment/Plan:  56y Male status-post   CABGx3 L-Radial        POD #       1    - Case and plan discussed with CTU Intensivist and CT Surgeon - Dr. Mantilla  - Continue CTU supportive care    - Continue DVT/GI prophylaxis  - Incentive Spirometry 10 times an hour  - Continue to advance physical activity as tolerated and continue PT/OT as directed  - Discharged femoral a-line   1. CAD: Continue ASA, statin, BB and dobutamine   2. HTN: Continue to monitor   3. DM/Glucose Control: Continue Insulin sliding scale     OPERATIVE PROCEDURE(s):        CABGx3 L-Radial        POD #       1                56yMale  SURGEON(s): JEFF Mantilla  SUBJECTIVE ASSESSMENT: 55 yo male with PMH of HTN, DLP, and CAD. Patient reports no new complaints at this time.      Vital Signs Last 24 Hrs  T(F): 98.8 (13 Feb 2021 05:30), Max: 99.1 (13 Feb 2021 03:30)  HR: 88 (13 Feb 2021 07:00) (66 - 88)  BP: 127/80 (12 Feb 2021 14:00) (103/68 - 140/90)  BP(mean): 99 (12 Feb 2021 14:00) (81 - 105)  ABP: 92/63 (13 Feb 2021 07:00) (-13/-13 - 166/89)  ABP(mean): 73 (13 Feb 2021 07:00)  RR: 23 (13 Feb 2021 07:00) (15 - 23)  SpO2: 99% (13 Feb 2021 07:00) (97% - 100%)  CVP(mm Hg): 12 (13 Feb 2021 07:00)  CO: 4.7 (13 Feb 2021 05:30)  CI: 2.1 (13 Feb 2021 05:30)  PA: 25/19 (13 Feb 2021 07:00)  SVR: 1122 (13 Feb 2021 05:30)  Mode: CPAP with PS  PEEP: 5  PS: 5    I&O's Detail    12 Feb 2021 07:01  -  13 Feb 2021 07:00  --------------------------------------------------------  IN:    Dexmedetomidine: 175 mL    DOBUTamine: 58.5 mL    Heparin: 13 mL    Insulin: 8 mL    IV PiggyBack: 400 mL    Nitroglycerin: 99 mL    sodium chloride 0.9%: 705 mL  Total IN: 1458.5 mL    OUT:    Chest Tube (mL): 130 mL    Chest Tube (mL): 65 mL    Indwelling Catheter - Urethral (mL): 1125 mL  Total OUT: 1320 mL        Net:   I&O's Detail    11 Feb 2021 07:01  -  12 Feb 2021 07:00  --------------------------------------------------------  Total NET: -494 mL      12 Feb 2021 07:01  -  13 Feb 2021 07:00  --------------------------------------------------------  Total NET: 138.5 mL        CAPILLARY BLOOD GLUCOSE  POCT Blood Glucose.: 179 mg/dL (13 Feb 2021 01:48)  POCT Blood Glucose.: 130 mg/dL (12 Feb 2021 23:31)  A1C with Estimated Average Glucose Result: 6.0 (02.12.21 @ 12:14)     Physical Exam:  General: NAD; A&Ox3  Cardiac: +rubs, S1/S2, RRR, no murmur  Lungs: unlabored respirations, CTA b/l, no wheeze, no rales, no crackles  Abdomen: Soft/NT/ND; positive bowel sounds x 4  Sternum: Intact, no click, incision healing well with no drainage  Incisions: Incisions clean/dry/intact  Extremities: Trace edema b/l lower extremities; good capillary refill; no cyanosis; palpable 1+ pedal pulses b/l    Central Venous Catheter: Yes[x]  No[] , If Yes indication:           Day #  1  Younger Catheter: Yes  [x] , No  [] , If yes indication:                      Day #  1  NGT: Yes [] No [x] ,    If Yes Placement:                                     Day #  1  EPICARDIAL WIRES:  [x] YES [] NO                                              Day # 1  BOWEL MOVEMENT:  [] YES [x] NO, If No, Timing since last BM:      Day #  1  CHEST TUBE(Left/Right):  [x] YES [] NO, If yes -  AIR LEAKS:  [] YES [x] NO        LABS:                        11.0<L>  6.99  )-----------( 121<L>    ( 13 Feb 2021 02:00 )             32.1<L>                        10.6<L>  7.47  )-----------( 118<L>    ( 12 Feb 2021 21:40 )             30.2<L>    02-13    136  |  106  |  14  ----------------------------<  178<H>  4.4   |  21  |  1.1  02-12    137  |  105  |  14  ----------------------------<  99  4.1   |  20  |  1.0    Ca    7.9<L>      13 Feb 2021 02:00  Mg     2.5     02-13    TPro  5.5<L> [6.0 - 8.0]  /  Alb  4.0 [3.5 - 5.2]  /  TBili  1.0 [0.2 - 1.2]  /  DBili  x   /  AST  45<H> [0 - 41]  /  ALT  17 [0 - 41]  /  AlkPhos  29<L> [30 - 115]  02-13    PT/INR - ( 13 Feb 2021 02:00 )   PT: ;   INR: 1.36 ratio         PT/INR - ( 12 Feb 2021 21:40 )   PT: ;   INR: 1.38 ratio         PTT - ( 13 Feb 2021 02:00 )  PTT:43.7 sec, PTT - ( 12 Feb 2021 21:40 )  PTT:29.5 sec    ABG - ( 13 Feb 2021 06:19 )  pH: 7.41  /  pCO2: 38    /  pO2: 133   / HCO3: 24    / Base Excess: -0.6  /  SaO2: 99    /  LA: 0.6            RADIOLOGY & ADDITIONAL TESTS:  CXR:  < from: Xray Chest 1 View-PORTABLE IMMEDIATE (Xray Chest 1 View-PORTABLE IMMEDIATE .) (02.12.21 @ 22:39) >    Impression:    Interval median sternotomy.    No pneumothorax.    Interval intubation with distal tip of endotracheal tube approximately 5 cm above kong. Enteric tube extends below the diaphragm. Post 2 left-sided chest tube placement. Stable right [place] catheter. Tube overlies mediastinum.      EKG:  < from: 12 Lead ECG (02.13.21 @ 08:15) >    Ventricular Rate 81 BPM    Atrial Rate 81 BPM    P-R Interval 148 ms    QRS Duration 132 ms    Q-T Interval 398 ms    QTC Calculation(Bazett) 462 ms    P Axis 35 degrees    R Axis 24 degrees    T Axis -12 degrees    Diagnosis Line *** Poor data quality, interpretation may be adversely affected  Normal sinus rhythm  Non-specific intra-ventricular conduction block  ST elevation, consider early repolarization, pericarditis, or injury  T wave abnormality, consider inferior ischemia  Abnormal ECG      MEDICATIONS  (STANDING):  acetaminophen  IVPB .. 1000 milliGRAM(s) IV Intermittent once  acetaminophen  IVPB .. 1000 milliGRAM(s) IV Intermittent once  acetaminophen  IVPB .. 1000 milliGRAM(s) IV Intermittent once  aspirin enteric coated 325 milliGRAM(s) Oral daily  bisacodyl 5 milliGRAM(s) Oral every 12 hours  ceFAZolin   IVPB 1000 milliGRAM(s) IV Intermittent every 8 hours  chlorhexidine 0.12% Liquid 5 milliLiter(s) Oral Mucosa two times a day  chlorhexidine 0.12% Liquid 15 milliLiter(s) Oral Mucosa every 12 hours  chlorhexidine 4% Liquid 1 Application(s) Topical <User Schedule>  dexMEDEtomidine Infusion 0.25 MICROgram(s)/kG/Hr (6.82 mL/Hr) IV Continuous <Continuous>  dextrose 50% Injectable 50 milliLiter(s) IV Push every 15 minutes  dextrose 50% Injectable 25 milliLiter(s) IV Push every 15 minutes  DOBUTamine Infusion 2 MICROgram(s)/kG/Min (6.55 mL/Hr) IV Continuous <Continuous>  famotidine Injectable 20 milliGRAM(s) IV Push every 12 hours  insulin regular Infusion 1 Unit(s)/Hr (1 mL/Hr) IV Continuous <Continuous>  meperidine     Injectable 25 milliGRAM(s) IV Push once  niCARdipine Infusion 5 mG/Hr (25 mL/Hr) IV Continuous <Continuous>  nitroglycerin  Infusion 5 MICROgram(s)/Min (1.5 mL/Hr) IV Continuous <Continuous>  norepinephrine Infusion 0.05 MICROgram(s)/kG/Min (10.2 mL/Hr) IV Continuous <Continuous>  polyethylene glycol 3350 17 Gram(s) Oral daily  propofol Infusion 15 MICROgram(s)/kG/Min (9.82 mL/Hr) IV Continuous <Continuous>  sodium chloride 0.9% Bolus 300 milliLiter(s) IV Bolus once  sodium chloride 0.9%. 1000 milliLiter(s) (20 mL/Hr) IV Continuous <Continuous>  sodium chloride 0.9%. 1000 milliLiter(s) (75 mL/Hr) IV Continuous <Continuous>  vancomycin  IVPB 1000 milliGRAM(s) IV Intermittent every 12 hours    MEDICATIONS  (PRN):  fentaNYL    Injectable 50 MICROGram(s) IV Push once PRN Moderate Pain (4 - 6)  ondansetron  IVPB 4 milliGRAM(s) IV Intermittent once PRN Nausea and/or Vomiting  oxyCODONE    IR 5 milliGRAM(s) Oral every 4 hours PRN Moderate Pain (4 - 6)  oxyCODONE    IR 10 milliGRAM(s) Oral every 4 hours PRN Severe Pain (7 - 10)    HEPARIN:  [x] YES [] NO  Dose: 3,000 UNITS/HR UNITS Q8H  LOVENOX:[] YES [x] NO  Dose: XX mg Q24H  COUMADIN: []  YES [x] NO  Dose: XX mg  Q24H  SCD's: YES b/l  GI Prophylaxis: Protonix [], Pepcid [x], None [], (Contra-indication:.....)    Post-Op Beta-Blockers: Yes [], No[x], If No, then contraindication: currently on pressors  Post-Op Calcium Channel Blockers: Yes [], No[x], If No, then contraindication: currently on pressors   Post-Op Aspirin: Yes [x],  No [], If No, then contraindication:  Post-Op Statin: Yes [], No[x], If No, then contraindication: elevated LFTs    Allergies  No Known Allergies  Intolerances    Ambulation/Activity Status: ambulating as tolerated    Assessment/Plan:  56y Male status-post   CABGx3 L-Radial        POD #       1    - Case and plan discussed with CTU Intensivist and CT Surgeon - Dr. Mantilla  - Continue CTU supportive care    - Continue DVT/GI prophylaxis  - Incentive Spirometry 10 times an hour  - Continue to advance physical activity as tolerated and continue PT/OT as directed  - Discharged femoral a-line   1. CAD: Continue ASA, statin, BB and dobutamine   2. HTN: Continue to monitor   3. DM/Glucose Control: Continue Insulin sliding scale

## 2021-02-13 NOTE — PROGRESS NOTE ADULT - SUBJECTIVE AND OBJECTIVE BOX
CTU Attending Progress Daily Note     13 Feb 2021 12:26  Admited 02-11-21, Hospital Day 2d  POD# - 1    HPI:  This is a 56 year old male with PMHx of HTN, DLD, and ?Prediabetes who presented to the ED with chest pain. As per the patient reports that he had an exercise stress test done recently that was positive for symptoms and ekg changes following moderate workload. Imaging noted ischemia in the RCA area as well as hypokinesis and decreased thickening of the inferior wall. He notes that for a few months since his father passed away he has been having some stable angina. Reported exertional chest tightness alleviated with rest. He was being worked up by Dr. Weinstein with elective cath schedueld for 2/16/2021. However he noted on the day of presentation that he was lifting something heavy and he had further episode of chest tightness/pain. He had been advised to go to the ED if he had further episodes of chest pain so that is what he did.     In the ED initial vitals: /90, HR 78, T 96.8, Sat 98% on room air. Seen by cardiology who advised add on for cardiac cath. Cardiac cath performed and noted triple vessel disease. Patient to be admitted to the CCU for CT surgery workup and inpatient surgery.  (11 Feb 2021 21:35)    Home Medications:  amLODIPine 5 mg oral tablet: 1 tab(s) orally once a day (11 Feb 2021 21:35)  Bystolic 20 mg oral tablet: 1 tab(s) orally once a day (11 Feb 2021 21:35)  isosorbide mononitrate 30 mg oral tablet, extended release: 1 tab(s) orally once a day (in the morning) (11 Feb 2021 21:35)  rosuvastatin 5 mg oral tablet: 1 tab(s) orally once a day (11 Feb 2021 21:35)    FAMILY HISTORY:  FH: heart disease  father and uncles      PAST MEDICAL & SURGICAL HISTORY:  Dyslipidemia    Hypertension    No significant past surgical history      Interval event for past 24 hr:  JACKY GUZMAN  56y had no event.   Current Complains:  JACKY GUZMAN has no new complains  Allergies    No Known Allergies    Intolerances      OBJECTIVE:    T(C): 37.5 (02-13-21 @ 12:00), Max: 37.5 (02-13-21 @ 12:00)  T(F): 99.5 (02-13-21 @ 12:00), Max: 99.5 (02-13-21 @ 12:00)  HR: 87 (02-13-21 @ 12:00) (66 - 93)  BP: 96/57 (02-13-21 @ 09:00) (77/48 - 136/82)  ABP: 98/46 (02-13-21 @ 12:00) (-13/-13 - 166/89)  ABP(mean): 63 (02-13-21 @ 12:00) (-13 - 115)  RR: 26 (02-13-21 @ 12:00) (15 - 28)  SpO2: 100% (02-13-21 @ 12:00) (88% - 100%)  CVP(mm Hg): 4 (02-13-21 @ 12:00) (-8 - 21)  CI: 3.4 (02-13-21 @ 12:00) (2.1 - 3.4)  PA: 20/12 (02-13-21 @ 12:00) (-11/-11 - 36/20)  PA(direct): --  PCWP: --  SVR: 620 (02-13-21 @ 12:00) (620 - 1122)  PVR: --    02-12-21 @ 07:01  -  02-13-21 @ 07:00  --------------------------------------------------------  IN:    Dexmedetomidine: 175 mL    DOBUTamine: 65 mL    Heparin: 13 mL    Insulin: 8 mL    IV PiggyBack: 400 mL    Nitroglycerin: 99 mL    sodium chloride 0.9%: 725 mL  Total IN: 1485 mL    OUT:    Chest Tube (mL): 65 mL    Chest Tube (mL): 140 mL    Indwelling Catheter - Urethral (mL): 1200 mL  Total OUT: 1405 mL    Total NET: 80 mL      O2 is 4 l/min NC  CAPILLARY BLOOD GLUCOSE      POCT Blood Glucose.: 102 mg/dL (13 Feb 2021 10:27)  POCT Blood Glucose.: 87 mg/dL (13 Feb 2021 08:14)  POCT Blood Glucose.: 179 mg/dL (13 Feb 2021 01:48)  POCT Blood Glucose.: 130 mg/dL (12 Feb 2021 23:31)    LABS:  ABG - ( 13 Feb 2021 06:19 )  pH, Arterial: 7.41  pH, Blood: x     /  pCO2: 38    /  pO2: 133   / HCO3: 24    / Base Excess: -0.6  /  SaO2: 99              Blood Gas Arterial, Lactate: 0.6 mmoL/L (02-13-21 @ 06:19)  Blood Gas Arterial, Lactate: 1.1 mmoL/L (02-13-21 @ 02:41)  Blood Gas Arterial, Lactate: 0.9 mmoL/L (02-12-21 @ 21:30)                          11.0   6.99  )-----------( 121      ( 13 Feb 2021 02:00 )             32.1     Hemoglobin: 11.0 g/dL (02-13 @ 02:00)  Hemoglobin: 10.6 g/dL (02-12 @ 21:40)  Hemoglobin: 11.2 g/dL (02-12 @ 18:42)  Hemoglobin: 14.2 g/dL (02-12 @ 04:37)  Hemoglobin: 14.9 g/dL (02-11 @ 15:49)    02-13    136  |  106  |  14  ----------------------------<  178<H>  4.4   |  21  |  1.1    Ca    7.9<L>      13 Feb 2021 02:00  Mg     2.5     02-13    TPro  5.5<L>  /  Alb  4.0  /  TBili  1.0  /  DBili  x   /  AST  45<H>  /  ALT  17  /  AlkPhos  29<L>  02-13    Creatinine, Serum: 1.1 mg/dL (02-13 @ 02:00)  Creatinine, Serum: 1.0 mg/dL (02-12 @ 21:40)  Creatinine, Serum: 1.0 mg/dL (02-12 @ 04:37)  Creatinine, Serum: 1.1 mg/dL (02-11 @ 15:49)    PT/INR - ( 13 Feb 2021 02:00 )   PT: 15.60 sec;   INR: 1.36 ratio         PTT - ( 13 Feb 2021 02:00 )  PTT:43.7 sec      HOSPITAL MEDICATIONS:  MEDICATIONS  (STANDING):  acetaminophen  IVPB .. 1000 milliGRAM(s) IV Intermittent once  acetaminophen  IVPB .. 1000 milliGRAM(s) IV Intermittent once  albumin human  5% IVPB 500 milliLiter(s) IV Intermittent once  albuterol/ipratropium for Nebulization 3 milliLiter(s) Nebulizer every 6 hours  aspirin enteric coated 325 milliGRAM(s) Oral daily  bisacodyl 5 milliGRAM(s) Oral every 12 hours  ceFAZolin   IVPB 1000 milliGRAM(s) IV Intermittent every 8 hours  chlorhexidine 4% Liquid 1 Application(s) Topical <User Schedule>  dextrose 40% Gel 15 Gram(s) Oral once  dextrose 5%. 1000 milliLiter(s) (50 mL/Hr) IV Continuous <Continuous>  dextrose 5%. 1000 milliLiter(s) (100 mL/Hr) IV Continuous <Continuous>  dextrose 50% Injectable 25 Gram(s) IV Push once  dextrose 50% Injectable 12.5 Gram(s) IV Push once  dextrose 50% Injectable 25 Gram(s) IV Push once  DOBUTamine Infusion 2 MICROgram(s)/kG/Min (6.55 mL/Hr) IV Continuous <Continuous>  famotidine    Tablet 20 milliGRAM(s) Oral two times a day  glucagon  Injectable 1 milliGRAM(s) IntraMuscular once  insulin lispro (ADMELOG) corrective regimen sliding scale   SubCutaneous three times a day before meals  meperidine     Injectable 25 milliGRAM(s) IV Push once  norepinephrine Infusion 0.05 MICROgram(s)/kG/Min (10.2 mL/Hr) IV Continuous <Continuous>  polyethylene glycol 3350 17 Gram(s) Oral daily  senna 2 Tablet(s) Oral at bedtime  sodium chloride 0.9% Bolus 300 milliLiter(s) IV Bolus once  sodium chloride 0.9%. 1000 milliLiter(s) (20 mL/Hr) IV Continuous <Continuous>  sodium chloride 0.9%. 1000 milliLiter(s) (75 mL/Hr) IV Continuous <Continuous>  vancomycin  IVPB 1000 milliGRAM(s) IV Intermittent every 12 hours    MEDICATIONS  (PRN):  fentaNYL    Injectable 50 MICROGram(s) IV Push once PRN Moderate Pain (4 - 6)  ondansetron  IVPB 4 milliGRAM(s) IV Intermittent once PRN Nausea and/or Vomiting  oxyCODONE    IR 5 milliGRAM(s) Oral every 4 hours PRN Moderate Pain (4 - 6)  oxyCODONE    IR 10 milliGRAM(s) Oral every 4 hours PRN Severe Pain (7 - 10)      REVIEW OF SYSTEMS:  CONSTITUTIONAL: [X] all negative; [ ] weakness, [ ] fevers, [ ] chills  EYES/ENT: [X] all negative; [ ] visual changes, [ ] vertigo, [ ] throat pain, [ ] eye pain  NECK: [X] all negative; [ ] pain, [ ] stiffness  RESPIRATORY: [ ] all negative, [x ] cough, [ ] wheezing, [ ] hemoptysis, [ ] shortness of breath, [ x ] chest pain  CARDIOVASCULAR: [X] all negative; [ ] anginal chest pain, [ ] palpitations, [ ] orthopnea  GASTROINTESTINAL: [X] all negative; [ ]abdominal pain, [ ] nausea, [ ] vomiting, [ ] hematemesis, [ ] diarrhea, [ ] constipation, [ ] melena, [ ] hematochezia.  GENITOURINARY: [X] all negative; [ ] dysuria, [ ] frequency, [ ] hematuria  NEUROLOGICAL: [X] all negative; [ ] numbness, [ ] weakness, [ ] paresthesias  MUSCULOSKELETAL: [X] all negative, [ ] joint pain, [ ] joint swelling, [ ] joint redness, [ ] bone pain  SKIN: [X] all negative; [ ] itching, [ ] burning, [ ] rashes, [ ] lesions   All other review of systems is negative unless indicated above.    [  ] Unable to assess ROS because     PHYSICAL EXAM:          CONSTITUTIONAL: Well-developed; well-nourished; in no acute distress.   	SKIN: warm, dry, no rashes or lesions  	HEENT: Atraumatic. Normocephalic. PERRL. Moist membranes, no conjunctival injection, sclera clear  	NECK: Supple; non tender.  No JVD. No lymphadenopathy.  	CARD: Normal S1, S2. Rate and Rhythm are regular. No murmurs. pericardial rub   	RESP: Good air entry bilaterally, no wheezes, no rales no rhonchi.  	ABD: Soft, not tender, not distended, no CVA tenderness, no rebound no guarding, bowel sounds present  	EXT: Normal ROM.  No clubbing, no cyanosis, no pedal edema, no calf pain b/l, Peripheral pulses intact.  	LYMPH: No acute cervical adenopathy.  	NEURO: Alert, awake, motor 5/5 R, 5/5 L, sensation intact bilat, CN 2-12 intact,          PSYCH: Cooperative, appropriate. Alert & oriented x 3    RADIOLOGY:  X Reviewed and interpreted by me  CxR from 02-13-21 shows mild congestion, no pneumothorax, no effusion, no cardiomegaly,   ETT above kong in good position, NGT in place, TLC in place, S-G Catheter in place, Chest Tubes in place,     ECG:  X Reviewed and interpreted by me - NSR 81, QTc - 462

## 2021-02-14 LAB
ALBUMIN SERPL ELPH-MCNC: 4.2 G/DL — SIGNIFICANT CHANGE UP (ref 3.5–5.2)
ALP SERPL-CCNC: 35 U/L — SIGNIFICANT CHANGE UP (ref 30–115)
ALT FLD-CCNC: 14 U/L — SIGNIFICANT CHANGE UP (ref 0–41)
ANION GAP SERPL CALC-SCNC: 10 MMOL/L — SIGNIFICANT CHANGE UP (ref 7–14)
ANION GAP SERPL CALC-SCNC: 12 MMOL/L — SIGNIFICANT CHANGE UP (ref 7–14)
APTT BLD: 31.6 SEC — SIGNIFICANT CHANGE UP (ref 27–39.2)
AST SERPL-CCNC: 36 U/L — SIGNIFICANT CHANGE UP (ref 0–41)
BASOPHILS # BLD AUTO: 0.04 K/UL — SIGNIFICANT CHANGE UP (ref 0–0.2)
BASOPHILS NFR BLD AUTO: 0.5 % — SIGNIFICANT CHANGE UP (ref 0–1)
BILIRUB SERPL-MCNC: 0.7 MG/DL — SIGNIFICANT CHANGE UP (ref 0.2–1.2)
BUN SERPL-MCNC: 10 MG/DL — SIGNIFICANT CHANGE UP (ref 10–20)
BUN SERPL-MCNC: 13 MG/DL — SIGNIFICANT CHANGE UP (ref 10–20)
CALCIUM SERPL-MCNC: 8.4 MG/DL — LOW (ref 8.5–10.1)
CALCIUM SERPL-MCNC: 8.6 MG/DL — SIGNIFICANT CHANGE UP (ref 8.5–10.1)
CHLORIDE SERPL-SCNC: 100 MMOL/L — SIGNIFICANT CHANGE UP (ref 98–110)
CHLORIDE SERPL-SCNC: 102 MMOL/L — SIGNIFICANT CHANGE UP (ref 98–110)
CO2 SERPL-SCNC: 24 MMOL/L — SIGNIFICANT CHANGE UP (ref 17–32)
CO2 SERPL-SCNC: 26 MMOL/L — SIGNIFICANT CHANGE UP (ref 17–32)
CREAT SERPL-MCNC: 0.9 MG/DL — SIGNIFICANT CHANGE UP (ref 0.7–1.5)
CREAT SERPL-MCNC: 1.1 MG/DL — SIGNIFICANT CHANGE UP (ref 0.7–1.5)
EOSINOPHIL # BLD AUTO: 0.04 K/UL — SIGNIFICANT CHANGE UP (ref 0–0.7)
EOSINOPHIL NFR BLD AUTO: 0.5 % — SIGNIFICANT CHANGE UP (ref 0–8)
GLUCOSE BLDC GLUCOMTR-MCNC: 113 MG/DL — HIGH (ref 70–99)
GLUCOSE BLDC GLUCOMTR-MCNC: 123 MG/DL — HIGH (ref 70–99)
GLUCOSE BLDC GLUCOMTR-MCNC: 157 MG/DL — HIGH (ref 70–99)
GLUCOSE BLDC GLUCOMTR-MCNC: 97 MG/DL — SIGNIFICANT CHANGE UP (ref 70–99)
GLUCOSE SERPL-MCNC: 104 MG/DL — HIGH (ref 70–99)
GLUCOSE SERPL-MCNC: 90 MG/DL — SIGNIFICANT CHANGE UP (ref 70–99)
HCT VFR BLD CALC: 36.2 % — LOW (ref 42–52)
HGB BLD-MCNC: 12.1 G/DL — LOW (ref 14–18)
IMM GRANULOCYTES NFR BLD AUTO: 0.6 % — HIGH (ref 0.1–0.3)
INR BLD: 1.37 RATIO — HIGH (ref 0.65–1.3)
LYMPHOCYTES # BLD AUTO: 1.96 K/UL — SIGNIFICANT CHANGE UP (ref 1.2–3.4)
LYMPHOCYTES # BLD AUTO: 22.2 % — SIGNIFICANT CHANGE UP (ref 20.5–51.1)
MAGNESIUM SERPL-MCNC: 2.1 MG/DL — SIGNIFICANT CHANGE UP (ref 1.8–2.4)
MAGNESIUM SERPL-MCNC: 2.4 MG/DL — SIGNIFICANT CHANGE UP (ref 1.8–2.4)
MCHC RBC-ENTMCNC: 29.9 PG — SIGNIFICANT CHANGE UP (ref 27–31)
MCHC RBC-ENTMCNC: 33.4 G/DL — SIGNIFICANT CHANGE UP (ref 32–37)
MCV RBC AUTO: 89.4 FL — SIGNIFICANT CHANGE UP (ref 80–94)
MONOCYTES # BLD AUTO: 1 K/UL — HIGH (ref 0.1–0.6)
MONOCYTES NFR BLD AUTO: 11.3 % — HIGH (ref 1.7–9.3)
NEUTROPHILS # BLD AUTO: 5.74 K/UL — SIGNIFICANT CHANGE UP (ref 1.4–6.5)
NEUTROPHILS NFR BLD AUTO: 64.9 % — SIGNIFICANT CHANGE UP (ref 42.2–75.2)
NRBC # BLD: 0 /100 WBCS — SIGNIFICANT CHANGE UP (ref 0–0)
PLATELET # BLD AUTO: 149 K/UL — SIGNIFICANT CHANGE UP (ref 130–400)
POTASSIUM SERPL-MCNC: 3.5 MMOL/L — SIGNIFICANT CHANGE UP (ref 3.5–5)
POTASSIUM SERPL-MCNC: 4.5 MMOL/L — SIGNIFICANT CHANGE UP (ref 3.5–5)
POTASSIUM SERPL-SCNC: 3.5 MMOL/L — SIGNIFICANT CHANGE UP (ref 3.5–5)
POTASSIUM SERPL-SCNC: 4.5 MMOL/L — SIGNIFICANT CHANGE UP (ref 3.5–5)
PROT SERPL-MCNC: 6.2 G/DL — SIGNIFICANT CHANGE UP (ref 6–8)
PROTHROM AB SERPL-ACNC: 15.7 SEC — HIGH (ref 9.95–12.87)
RBC # BLD: 4.05 M/UL — LOW (ref 4.7–6.1)
RBC # FLD: 13.5 % — SIGNIFICANT CHANGE UP (ref 11.5–14.5)
SODIUM SERPL-SCNC: 136 MMOL/L — SIGNIFICANT CHANGE UP (ref 135–146)
SODIUM SERPL-SCNC: 138 MMOL/L — SIGNIFICANT CHANGE UP (ref 135–146)
WBC # BLD: 8.83 K/UL — SIGNIFICANT CHANGE UP (ref 4.8–10.8)
WBC # FLD AUTO: 8.83 K/UL — SIGNIFICANT CHANGE UP (ref 4.8–10.8)

## 2021-02-14 PROCEDURE — 99233 SBSQ HOSP IP/OBS HIGH 50: CPT

## 2021-02-14 PROCEDURE — 71045 X-RAY EXAM CHEST 1 VIEW: CPT | Mod: 26,76

## 2021-02-14 PROCEDURE — 93010 ELECTROCARDIOGRAM REPORT: CPT

## 2021-02-14 RX ORDER — METOPROLOL TARTRATE 50 MG
25 TABLET ORAL EVERY 8 HOURS
Refills: 0 | Status: DISCONTINUED | OUTPATIENT
Start: 2021-02-14 | End: 2021-02-16

## 2021-02-14 RX ORDER — METOPROLOL TARTRATE 50 MG
2.5 TABLET ORAL ONCE
Refills: 0 | Status: COMPLETED | OUTPATIENT
Start: 2021-02-14 | End: 2021-02-14

## 2021-02-14 RX ORDER — POTASSIUM CHLORIDE 20 MEQ
20 PACKET (EA) ORAL ONCE
Refills: 0 | Status: COMPLETED | OUTPATIENT
Start: 2021-02-14 | End: 2021-02-14

## 2021-02-14 RX ORDER — LANOLIN ALCOHOL/MO/W.PET/CERES
5 CREAM (GRAM) TOPICAL ONCE
Refills: 0 | Status: COMPLETED | OUTPATIENT
Start: 2021-02-14 | End: 2021-02-14

## 2021-02-14 RX ORDER — AMLODIPINE BESYLATE 2.5 MG/1
2.5 TABLET ORAL DAILY
Refills: 0 | Status: DISCONTINUED | OUTPATIENT
Start: 2021-02-14 | End: 2021-02-16

## 2021-02-14 RX ORDER — METOPROLOL TARTRATE 50 MG
25 TABLET ORAL
Refills: 0 | Status: DISCONTINUED | OUTPATIENT
Start: 2021-02-14 | End: 2021-02-14

## 2021-02-14 RX ORDER — MAGNESIUM SULFATE 500 MG/ML
2 VIAL (ML) INJECTION ONCE
Refills: 0 | Status: COMPLETED | OUTPATIENT
Start: 2021-02-14 | End: 2021-02-14

## 2021-02-14 RX ORDER — POTASSIUM CHLORIDE 20 MEQ
20 PACKET (EA) ORAL
Refills: 0 | Status: COMPLETED | OUTPATIENT
Start: 2021-02-14 | End: 2021-02-14

## 2021-02-14 RX ADMIN — Medication 5 MILLIGRAM(S): at 21:48

## 2021-02-14 RX ADMIN — Medication 100 MILLIGRAM(S): at 17:37

## 2021-02-14 RX ADMIN — HEPARIN SODIUM 3000 UNIT(S): 5000 INJECTION INTRAVENOUS; SUBCUTANEOUS at 18:03

## 2021-02-14 RX ADMIN — Medication 2.5 MILLIGRAM(S): at 10:23

## 2021-02-14 RX ADMIN — Medication 5 MILLIGRAM(S): at 17:38

## 2021-02-14 RX ADMIN — Medication 325 MILLIGRAM(S): at 11:38

## 2021-02-14 RX ADMIN — SENNA PLUS 2 TABLET(S): 8.6 TABLET ORAL at 21:48

## 2021-02-14 RX ADMIN — FAMOTIDINE 20 MILLIGRAM(S): 10 INJECTION INTRAVENOUS at 06:00

## 2021-02-14 RX ADMIN — HEPARIN SODIUM 3000 UNIT(S): 5000 INJECTION INTRAVENOUS; SUBCUTANEOUS at 06:00

## 2021-02-14 RX ADMIN — Medication 100 MILLIEQUIVALENT(S): at 11:00

## 2021-02-14 RX ADMIN — Medication 100 MILLIEQUIVALENT(S): at 06:26

## 2021-02-14 RX ADMIN — Medication 50 GRAM(S): at 10:00

## 2021-02-14 RX ADMIN — POLYETHYLENE GLYCOL 3350 17 GRAM(S): 17 POWDER, FOR SOLUTION ORAL at 11:38

## 2021-02-14 RX ADMIN — CHLORHEXIDINE GLUCONATE 1 APPLICATION(S): 213 SOLUTION TOPICAL at 06:00

## 2021-02-14 RX ADMIN — Medication 100 MILLIEQUIVALENT(S): at 10:00

## 2021-02-14 RX ADMIN — Medication 400 MILLIGRAM(S): at 17:37

## 2021-02-14 RX ADMIN — Medication 400 MILLIGRAM(S): at 11:38

## 2021-02-14 RX ADMIN — AMLODIPINE BESYLATE 2.5 MILLIGRAM(S): 2.5 TABLET ORAL at 12:37

## 2021-02-14 RX ADMIN — Medication 100 MILLIGRAM(S): at 09:23

## 2021-02-14 RX ADMIN — FAMOTIDINE 20 MILLIGRAM(S): 10 INJECTION INTRAVENOUS at 18:03

## 2021-02-14 RX ADMIN — Medication 5 MILLIGRAM(S): at 06:00

## 2021-02-14 RX ADMIN — Medication 100 MILLIEQUIVALENT(S): at 05:00

## 2021-02-14 RX ADMIN — Medication 25 MILLIGRAM(S): at 12:37

## 2021-02-14 RX ADMIN — Medication 400 MILLIGRAM(S): at 05:59

## 2021-02-14 RX ADMIN — Medication 25 MILLIGRAM(S): at 06:27

## 2021-02-14 RX ADMIN — Medication 25 MILLIGRAM(S): at 21:48

## 2021-02-14 NOTE — PROGRESS NOTE ADULT - SUBJECTIVE AND OBJECTIVE BOX
JACKY GUZMAN  MRN#: [id number]  Subjective:  Patient was seen and evalauted on AM rounds offerring no specific compliants at this time.    OBJECTIVE:  ICU Vital Signs Last 24 Hrs  T(C): 37.3 (2021 04:00), Max: 37.8 (2021 23:00)  T(F): 99.1 (2021 04:00), Max: 100.1 (2021 23:00)  HR: 99 (2021 06:07) (78 - 103)  BP: 140/91 (2021 06:07) (126/75 - 144/89)  BP(mean): 111 (2021 06:07) (94 - 111)  ABP: 145/90 (2021 04:00) (93/47 - 148/77)  ABP(mean): 110 (2021 04:00) (61 - 110)  RR: 20 (2021 21:00) (20 - 32)  SpO2: 96% (2021 06:07) (88% - 100%)       @ 07: @ 07:00  --------------------------------------------------------  IN: 3016.5 mL / OUT: 5505 mL / NET: -2488.5 mL     @ 07: @ 10:45  --------------------------------------------------------  IN: 240 mL / OUT: 540 mL / NET: -300 mL      CAPILLARY BLOOD GLUCOSE      POCT Blood Glucose.: 157 mg/dL (2021 06:38)      PHYSICAL EXAM:Daily     Daily Weight in k.7 (2021 06:07)  General: WN/WD NAD    HEENT:     + NCAT  + EOMI  - Conjuctival edema   - Icterus   - Thrush   - ETT  - NGT/OGT    Neck:         + FROM    - JVD     - Nodes     - Masses    + Mid-line trachea   - Tracheostomy    Chest:         - Sternal click  - Sternal drainage  + Pacing wires  + Chest tubes  - SubQ emphysema    Lungs:          + CTA   - Rhonchi    - Rales    - Wheezing     - Decreased BS   - Dullness R L    Cardiac:       + S1 + S2    + RRR   - Irregular   - S3  - S4    - Murmurs   - Rub   - Hamman’s sign     Abdomen:    + BS     + Soft    + Non-tender     - Distended    - Organomegaly  - PEG    Extremities:   - Cyanosis U/L   - Clubbing  U/L  - LE/UE Edema   + Capillary refill    + Pulses     Neuro:        + Awake   +  Alert   - Confused   - Lethargic   - Sedated   - Generalized Weakness    Skin:        - Rashes    - Erythema   + Normal incisions   + IV sites intact  - Sacral decubitus    HOSPITAL MEDICATIONS:  MEDICATIONS  (STANDING):  acetaminophen  IVPB .. 1000 milliGRAM(s) IV Intermittent once  acetaminophen  IVPB .. 1000 milliGRAM(s) IV Intermittent once  albuterol/ipratropium for Nebulization 3 milliLiter(s) Nebulizer every 6 hours  aspirin enteric coated 325 milliGRAM(s) Oral daily  bisacodyl 5 milliGRAM(s) Oral every 12 hours  ceFAZolin   IVPB 1000 milliGRAM(s) IV Intermittent every 8 hours  chlorhexidine 4% Liquid 1 Application(s) Topical <User Schedule>  dextrose 40% Gel 15 Gram(s) Oral once  dextrose 5%. 1000 milliLiter(s) (50 mL/Hr) IV Continuous <Continuous>  dextrose 5%. 1000 milliLiter(s) (100 mL/Hr) IV Continuous <Continuous>  dextrose 50% Injectable 25 Gram(s) IV Push once  dextrose 50% Injectable 12.5 Gram(s) IV Push once  dextrose 50% Injectable 25 Gram(s) IV Push once  famotidine    Tablet 20 milliGRAM(s) Oral two times a day  glucagon  Injectable 1 milliGRAM(s) IntraMuscular once  heparin   Injectable 3000 Unit(s) SubCutaneous every 12 hours  insulin lispro (ADMELOG) corrective regimen sliding scale   SubCutaneous three times a day before meals  magnesium sulfate  IVPB 2 Gram(s) IV Intermittent once  meperidine     Injectable 25 milliGRAM(s) IV Push once  metoprolol tartrate 25 milliGRAM(s) Oral every 8 hours  metoprolol tartrate Injectable 2.5 milliGRAM(s) IV Push once  polyethylene glycol 3350 17 Gram(s) Oral daily  potassium chloride  20 mEq/100 mL IVPB 20 milliEquivalent(s) IV Intermittent once  potassium chloride  20 mEq/100 mL IVPB 20 milliEquivalent(s) IV Intermittent once  senna 2 Tablet(s) Oral at bedtime  sodium chloride 0.9% Bolus 300 milliLiter(s) IV Bolus once  sodium chloride 0.9%. 1000 milliLiter(s) (20 mL/Hr) IV Continuous <Continuous>  sodium chloride 0.9%. 1000 milliLiter(s) (75 mL/Hr) IV Continuous <Continuous>    MEDICATIONS  (PRN):  fentaNYL    Injectable 50 MICROGram(s) IV Push once PRN Moderate Pain (4 - 6)  ondansetron  IVPB 4 milliGRAM(s) IV Intermittent once PRN Nausea and/or Vomiting  oxyCODONE    IR 5 milliGRAM(s) Oral every 4 hours PRN Moderate Pain (4 - 6)  oxyCODONE    IR 10 milliGRAM(s) Oral every 4 hours PRN Severe Pain (7 - 10)      LABS:                        12.1   8.83  )-----------( 149      ( 2021 07:29 )             36.2    02-14    136  |  100  |  10  ----------------------------<  104<H>  3.5   |  24  |  0.9    Ca    8.4<L>      2021 07:29  Mg     2.1     02-14    TPro  6.2  /  Alb  4.2  /  TBili  0.7  /  DBili  x   /  AST  36  /  ALT  14  /  AlkPhos  35  02-14    PT/INR - ( 2021 07:29 )   PT: 15.70 sec;   INR: 1.37 ratio         PTT - ( 2021 07:29 )  PTT:31.6 sec LIVER FUNCTIONS - ( 2021 07:29 )  Alb: 4.2 g/dL / Pro: 6.2 g/dL / ALK PHOS: 35 U/L / ALT: 14 U/L / AST: 36 U/L / GGT: x               RADIOLOGY:  X Reviewed and interpreted by me: no acute infiltrates    CARDIOPULMONARY DYSFUNCTION  - Respiratory status required supplemental oxygen & the following of continuous pulse oximetry for support & to prevent decompensation  - Continued early mobilization as tolerated  - Addressed analgesic regimen to optimize function    PREVENTION-PROPHYLAXIS  - ASA continued for graft occlusion-thromboembolism prophylaxis  - Heparin continued for VTE prophylaxis in addition to Venodyne boots  - Pepcid maintained for GI bleeding prophylaxis  - Lopressor continued for atrial fibrillation prophylaxis  - Metabolic stability & infection prophylaxis required review and adjustment of regular Insulin sliding scale and gylcemic regimen while following serial glucose levels to help achieve & maintain euglycemia  - Reviewed & addressed surgical site infection prophylaxis regimen

## 2021-02-14 NOTE — PROGRESS NOTE ADULT - SUBJECTIVE AND OBJECTIVE BOX
OPERATIVE PROCEDURE(s):     CABGx3 L-Radial                 POD #   2                    56yMale  SURGEON(s): JEFF Mantilla  SUBJECTIVE ASSESSMENT: pt seen and examined. no acute complaints at this time  Vital Signs Last 24 Hrs  T(F): 99.1 (14 Feb 2021 04:00), Max: 100.1 (13 Feb 2021 23:00)  HR: 99 (14 Feb 2021 06:07) (76 - 103)  BP: 140/91 (14 Feb 2021 06:07) (77/48 - 144/89)  BP(mean): 111 (14 Feb 2021 06:07) (57 - 111)  ABP: 145/90 (14 Feb 2021 04:00) (83/79 - 148/77)  ABP(mean): 110 (14 Feb 2021 04:00)  RR: 20 (13 Feb 2021 21:00) (17 - 32)  SpO2: 96% (14 Feb 2021 06:07) (88% - 100%)  CVP(mm Hg): 5 (13 Feb 2021 15:15)  CO: 6.8 (13 Feb 2021 15:00)  CI: 3.1 (13 Feb 2021 15:00)  PA: 227/10 (13 Feb 2021 15:15)  SVR: 916 (13 Feb 2021 15:00)    I&O's Detail    13 Feb 2021 07:01  -  14 Feb 2021 07:00  --------------------------------------------------------  IN:    Albumin 5%  - 500 mL: 500 mL    DOBUTamine: 30.5 mL    IV PiggyBack: 600 mL    Norepinephrine: 46 mL    Oral Fluid: 1660 mL    sodium chloride 0.9%: 180 mL  Total IN: 3016.5 mL    OUT:    Chest Tube (mL): 130 mL    Chest Tube (mL): 280 mL    Indwelling Catheter - Urethral (mL): 5095 mL  Total OUT: 5505 mL        Net:   I&O's Detail    12 Feb 2021 07:01  -  13 Feb 2021 07:00  --------------------------------------------------------  Total NET: 80 mL      13 Feb 2021 07:01  -  14 Feb 2021 07:00  --------------------------------------------------------  Total NET: -2488.5 mL        CAPILLARY BLOOD GLUCOSE      POCT Blood Glucose.: 157 mg/dL (14 Feb 2021 06:38)  POCT Blood Glucose.: 107 mg/dL (13 Feb 2021 22:38)  POCT Blood Glucose.: 104 mg/dL (13 Feb 2021 15:57)  POCT Blood Glucose.: 102 mg/dL (13 Feb 2021 10:27)  POCT Blood Glucose.: 87 mg/dL (13 Feb 2021 08:14)    Physical Exam:  General: NAD; A&Ox3  Cardiac: +rubs, S1/S2, RRR, no murmur  Lungs: unlabored respirations, CTA b/l, no wheeze, no rales, no crackles  Abdomen: Soft/NT/ND; positive bowel sounds x 4  Sternum: Intact, no click, incision healing well with no drainage  Incisions: Incisions clean/dry/intact  Extremities: Trace edema b/l lower extremities; good capillary refill; no cyanosis; palpable 1+ pedal pulses b/l    Central Venous Catheter: Yes[x]  No[] , If Yes indication:     hd unstable      Day #  2  Younger Catheter: Yes  [x] , No  [] , If yes indication:    strict i/o                  Day #  2  NGT: Yes [] No [x] ,    If Yes Placement:                                     Day #  1  EPICARDIAL WIRES:  [x] YES [] NO                                              Day # 2  BOWEL MOVEMENT:  [] YES [x] NO, If No, Timing since last BM:      Day #    CHEST TUBE(Left/Right):  [x] YES [] NO, If yes -  AIR LEAKS:  [] YES [x] NO      LABS:                        11.0<L>  6.99  )-----------( 121<L>    ( 13 Feb 2021 02:00 )             32.1<L>                        10.6<L>  7.47  )-----------( 118<L>    ( 12 Feb 2021 21:40 )             30.2<L>    02-13    136  |  106  |  14  ----------------------------<  178<H>  4.4   |  21  |  1.1  02-12    137  |  105  |  14  ----------------------------<  99  4.1   |  20  |  1.0    Ca    7.9<L>      13 Feb 2021 02:00  Mg     2.5     02-13    TPro  5.5<L> [6.0 - 8.0]  /  Alb  4.0 [3.5 - 5.2]  /  TBili  1.0 [0.2 - 1.2]  /  DBili  x   /  AST  45<H> [0 - 41]  /  ALT  17 [0 - 41]  /  AlkPhos  29<L> [30 - 115]  02-13    PT/INR - ( 13 Feb 2021 02:00 )   PT: ;   INR: 1.36 ratio         PT/INR - ( 12 Feb 2021 21:40 )   PT: ;   INR: 1.38 ratio         PTT - ( 13 Feb 2021 02:00 )  PTT:43.7 sec, PTT - ( 12 Feb 2021 21:40 )  PTT:29.5 sec    ABG - ( 14 Feb 2021 03:44 )  pH: 7.50  /  pCO2: 35    /  pO2: 60    / HCO3: 27    / Base Excess: 3.8   /  SaO2: 93    /  LA: 0.7              RADIOLOGY & ADDITIONAL TESTS:  CXR: < from: Xray Chest 1 View- PORTABLE-Routine (02.13.21 @ 04:52) >  Impression:    No radiographic evidence of acute cardiopulmonary disease.    < end of copied text >    EKG:  MEDICATIONS  (STANDING):  acetaminophen  IVPB .. 1000 milliGRAM(s) IV Intermittent once  acetaminophen  IVPB .. 1000 milliGRAM(s) IV Intermittent once  albuterol/ipratropium for Nebulization 3 milliLiter(s) Nebulizer every 6 hours  aspirin enteric coated 325 milliGRAM(s) Oral daily  bisacodyl 5 milliGRAM(s) Oral every 12 hours  ceFAZolin   IVPB 1000 milliGRAM(s) IV Intermittent every 8 hours  chlorhexidine 4% Liquid 1 Application(s) Topical <User Schedule>  dextrose 40% Gel 15 Gram(s) Oral once  dextrose 5%. 1000 milliLiter(s) (50 mL/Hr) IV Continuous <Continuous>  dextrose 5%. 1000 milliLiter(s) (100 mL/Hr) IV Continuous <Continuous>  dextrose 50% Injectable 25 Gram(s) IV Push once  dextrose 50% Injectable 12.5 Gram(s) IV Push once  dextrose 50% Injectable 25 Gram(s) IV Push once  famotidine    Tablet 20 milliGRAM(s) Oral two times a day  glucagon  Injectable 1 milliGRAM(s) IntraMuscular once  heparin   Injectable 3000 Unit(s) SubCutaneous every 12 hours  insulin lispro (ADMELOG) corrective regimen sliding scale   SubCutaneous three times a day before meals  meperidine     Injectable 25 milliGRAM(s) IV Push once  metoprolol tartrate 25 milliGRAM(s) Oral two times a day  polyethylene glycol 3350 17 Gram(s) Oral daily  senna 2 Tablet(s) Oral at bedtime  sodium chloride 0.9% Bolus 300 milliLiter(s) IV Bolus once  sodium chloride 0.9%. 1000 milliLiter(s) (20 mL/Hr) IV Continuous <Continuous>  sodium chloride 0.9%. 1000 milliLiter(s) (75 mL/Hr) IV Continuous <Continuous>    MEDICATIONS  (PRN):  fentaNYL    Injectable 50 MICROGram(s) IV Push once PRN Moderate Pain (4 - 6)  ondansetron  IVPB 4 milliGRAM(s) IV Intermittent once PRN Nausea and/or Vomiting  oxyCODONE    IR 5 milliGRAM(s) Oral every 4 hours PRN Moderate Pain (4 - 6)  oxyCODONE    IR 10 milliGRAM(s) Oral every 4 hours PRN Severe Pain (7 - 10)    HEPARIN:  [x] YES [] NO  Dose: 3000 UNITS Q8H    SCD's: YES b/l  GI Prophylaxis: Protonix [], Pepcid [x], None [], (Contra-indication:.....)    Post-Op Beta-Blockers: Yes [x], No[], If No, then contraindication:   Post-Op Aspirin: Yes [x],  No [], If No, then contraindication:  Post-Op Statin: Yes [], No[x], If No, then contraindication: elevated lfts  Allergies    No Known Allergies    Intolerances      Ambulation/Activity Status: ambulate     Assessment/Plan:  56y Male status-post .....  - Case and plan discussed with CTU Intensivist and CT Surgeon - Dr. Higgins/Bambi/Jose Rafael  - Continue CTU supportive care    - Continue DVT/GI prophylaxis  - Incentive Spirometry 10 times an hour  - Continue to advance physical activity as tolerated and continue PT/OT as directed  1. CAD: Continue ASA, statin, BB  2. HTN:   3. A. Fib:   4. COPD/Hypoxia:   5. DM/Glucose Control:     Social Service Disposition:     OPERATIVE PROCEDURE(s):     CABGx3 L-Radial                 POD #   2                    56yMale  SURGEON(s): JEFF Mantilla  SUBJECTIVE ASSESSMENT: pt seen and examined. no acute complaints at this time  Vital Signs Last 24 Hrs  T(F): 99.1 (14 Feb 2021 04:00), Max: 100.1 (13 Feb 2021 23:00)  HR: 99 (14 Feb 2021 06:07) (76 - 103)  BP: 140/91 (14 Feb 2021 06:07) (77/48 - 144/89)  BP(mean): 111 (14 Feb 2021 06:07) (57 - 111)  ABP: 145/90 (14 Feb 2021 04:00) (83/79 - 148/77)  ABP(mean): 110 (14 Feb 2021 04:00)  RR: 20 (13 Feb 2021 21:00) (17 - 32)  SpO2: 96% (14 Feb 2021 06:07) (88% - 100%)  CVP(mm Hg): 5 (13 Feb 2021 15:15)  CO: 6.8 (13 Feb 2021 15:00)  CI: 3.1 (13 Feb 2021 15:00)  PA: 227/10 (13 Feb 2021 15:15)  SVR: 916 (13 Feb 2021 15:00)    I&O's Detail    13 Feb 2021 07:01  -  14 Feb 2021 07:00  --------------------------------------------------------  IN:    Albumin 5%  - 500 mL: 500 mL    DOBUTamine: 30.5 mL    IV PiggyBack: 600 mL    Norepinephrine: 46 mL    Oral Fluid: 1660 mL    sodium chloride 0.9%: 180 mL  Total IN: 3016.5 mL    OUT:    Chest Tube (mL): 130 mL    Chest Tube (mL): 280 mL    Indwelling Catheter - Urethral (mL): 5095 mL  Total OUT: 5505 mL        Net:   I&O's Detail    12 Feb 2021 07:01  -  13 Feb 2021 07:00  --------------------------------------------------------  Total NET: 80 mL      13 Feb 2021 07:01  -  14 Feb 2021 07:00  --------------------------------------------------------  Total NET: -2488.5 mL        CAPILLARY BLOOD GLUCOSE      POCT Blood Glucose.: 157 mg/dL (14 Feb 2021 06:38)  POCT Blood Glucose.: 107 mg/dL (13 Feb 2021 22:38)  POCT Blood Glucose.: 104 mg/dL (13 Feb 2021 15:57)  POCT Blood Glucose.: 102 mg/dL (13 Feb 2021 10:27)  POCT Blood Glucose.: 87 mg/dL (13 Feb 2021 08:14)    Physical Exam:  General: NAD; A&Ox3  Cardiac: +rubs, S1/S2, RRR, no murmur  Lungs: unlabored respirations, CTA b/l, no wheeze, no rales, no crackles  Abdomen: Soft/NT/ND; positive bowel sounds x 4  Sternum: Intact, no click, incision healing well with no drainage  Incisions: Incisions clean/dry/intact  Extremities: Trace edema b/l lower extremities; good capillary refill; no cyanosis; palpable 1+ pedal pulses b/l    Central Venous Catheter: Yes[x]  No[] , If Yes indication:     hd unstable      Day #  2  Younger Catheter: Yes  [x] , No  [] , If yes indication:    strict i/o                  Day #  2  NGT: Yes [] No [x] ,    If Yes Placement:                                     Day #  1  EPICARDIAL WIRES:  [x] YES [] NO                                              Day # 2  BOWEL MOVEMENT:  [] YES [x] NO, If No, Timing since last BM:      Day #    CHEST TUBE(Left/Right):  [x] YES [] NO, If yes -  AIR LEAKS:  [] YES [x] NO      LABS:                        11.0<L>  6.99  )-----------( 121<L>    ( 13 Feb 2021 02:00 )             32.1<L>                        10.6<L>  7.47  )-----------( 118<L>    ( 12 Feb 2021 21:40 )             30.2<L>    02-13    136  |  106  |  14  ----------------------------<  178<H>  4.4   |  21  |  1.1  02-12    137  |  105  |  14  ----------------------------<  99  4.1   |  20  |  1.0    Ca    7.9<L>      13 Feb 2021 02:00  Mg     2.5     02-13    TPro  5.5<L> [6.0 - 8.0]  /  Alb  4.0 [3.5 - 5.2]  /  TBili  1.0 [0.2 - 1.2]  /  DBili  x   /  AST  45<H> [0 - 41]  /  ALT  17 [0 - 41]  /  AlkPhos  29<L> [30 - 115]  02-13    PT/INR - ( 13 Feb 2021 02:00 )   PT: ;   INR: 1.36 ratio         PT/INR - ( 12 Feb 2021 21:40 )   PT: ;   INR: 1.38 ratio         PTT - ( 13 Feb 2021 02:00 )  PTT:43.7 sec, PTT - ( 12 Feb 2021 21:40 )  PTT:29.5 sec    ABG - ( 14 Feb 2021 03:44 )  pH: 7.50  /  pCO2: 35    /  pO2: 60    / HCO3: 27    / Base Excess: 3.8   /  SaO2: 93    /  LA: 0.7              RADIOLOGY & ADDITIONAL TESTS:  CXR: < from: Xray Chest 1 View- PORTABLE-Routine (02.13.21 @ 04:52) >  Impression:    No radiographic evidence of acute cardiopulmonary disease.    < end of copied text >    EKG:  MEDICATIONS  (STANDING):  acetaminophen  IVPB .. 1000 milliGRAM(s) IV Intermittent once  acetaminophen  IVPB .. 1000 milliGRAM(s) IV Intermittent once  albuterol/ipratropium for Nebulization 3 milliLiter(s) Nebulizer every 6 hours  aspirin enteric coated 325 milliGRAM(s) Oral daily  bisacodyl 5 milliGRAM(s) Oral every 12 hours  ceFAZolin   IVPB 1000 milliGRAM(s) IV Intermittent every 8 hours  chlorhexidine 4% Liquid 1 Application(s) Topical <User Schedule>  dextrose 40% Gel 15 Gram(s) Oral once  dextrose 5%. 1000 milliLiter(s) (50 mL/Hr) IV Continuous <Continuous>  dextrose 5%. 1000 milliLiter(s) (100 mL/Hr) IV Continuous <Continuous>  dextrose 50% Injectable 25 Gram(s) IV Push once  dextrose 50% Injectable 12.5 Gram(s) IV Push once  dextrose 50% Injectable 25 Gram(s) IV Push once  famotidine    Tablet 20 milliGRAM(s) Oral two times a day  glucagon  Injectable 1 milliGRAM(s) IntraMuscular once  heparin   Injectable 3000 Unit(s) SubCutaneous every 12 hours  insulin lispro (ADMELOG) corrective regimen sliding scale   SubCutaneous three times a day before meals  meperidine     Injectable 25 milliGRAM(s) IV Push once  metoprolol tartrate 25 milliGRAM(s) Oral two times a day  polyethylene glycol 3350 17 Gram(s) Oral daily  senna 2 Tablet(s) Oral at bedtime  sodium chloride 0.9% Bolus 300 milliLiter(s) IV Bolus once  sodium chloride 0.9%. 1000 milliLiter(s) (20 mL/Hr) IV Continuous <Continuous>  sodium chloride 0.9%. 1000 milliLiter(s) (75 mL/Hr) IV Continuous <Continuous>    MEDICATIONS  (PRN):  fentaNYL    Injectable 50 MICROGram(s) IV Push once PRN Moderate Pain (4 - 6)  ondansetron  IVPB 4 milliGRAM(s) IV Intermittent once PRN Nausea and/or Vomiting  oxyCODONE    IR 5 milliGRAM(s) Oral every 4 hours PRN Moderate Pain (4 - 6)  oxyCODONE    IR 10 milliGRAM(s) Oral every 4 hours PRN Severe Pain (7 - 10)    HEPARIN:  [x] YES [] NO  Dose: 3000 UNITS Q8H    SCD's: YES b/l  GI Prophylaxis: Protonix [], Pepcid [x], None [], (Contra-indication:.....)    Post-Op Beta-Blockers: Yes [x], No[], If No, then contraindication:   Post-Op Aspirin: Yes [x],  No [], If No, then contraindication:  Post-Op Statin: Yes [], No[x], If No, then contraindication: elevated lfts  Allergies    No Known Allergies    Intolerances      Ambulation/Activity Status: ambulate     Assessment/Plan:  56y Male status-post   CABGx3 L-Radial        POD #       2  - Case and plan discussed with CTU Intensivist and CT Surgeon - Dr. Mantilla  - Continue CTU supportive care    - Continue DVT/GI prophylaxis  - Incentive Spirometry 10 times an hour  - Continue to advance physical activity as tolerated and continue PT/OT as directed  - Discharged femoral a-line   1. CAD: Continue ASA, statin, BB, norvasc to prevent radial vasospasm, pain control, d/c chest tubes  2. HTN: Cont bb, norvasc   3. DM/Glucose Control: Continue Insulin sliding scale  4. post-op hypoxia: cont nebs, mucinex, wean o2, encourage incentive spirometry    dispo: home

## 2021-02-14 NOTE — PROGRESS NOTE ADULT - ASSESSMENT
Assessment/Plan:  CAD-s/p CABG x 4-POD #2  1-BP control-increase beta-blockers, start amlodipine  2-serum glucose control-insulin sliding scale correction regimen  3-fluid overload-diuresis  4-zyittpauhui-rstsdve potassium

## 2021-02-14 NOTE — PHYSICAL THERAPY INITIAL EVALUATION ADULT - GENERAL OBSERVATIONS, REHAB EVAL
Pt remains fully lined, including +swan magali. PT to f/u when pt is de-lined and medically cleared for b/s PT.
[id number] Pt rec in b/s chair with +tele, +chest tubes x 2, +hernandez, in NAD. Pt agreeable to b/s PT, PT educated pt on sternal precautions and recommended level of activity when at home as requested by pt.

## 2021-02-14 NOTE — PHYSICAL THERAPY INITIAL EVALUATION ADULT - GAIT TRAINING, PT EVAL
By discharge: Amb 1000 ft independently without AD and negotiate 10 steps independently with 1 hand on rail.

## 2021-02-15 LAB
ALBUMIN SERPL ELPH-MCNC: 4.2 G/DL — SIGNIFICANT CHANGE UP (ref 3.5–5.2)
ALP SERPL-CCNC: 34 U/L — SIGNIFICANT CHANGE UP (ref 30–115)
ALT FLD-CCNC: 13 U/L — SIGNIFICANT CHANGE UP (ref 0–41)
ANION GAP SERPL CALC-SCNC: 11 MMOL/L — SIGNIFICANT CHANGE UP (ref 7–14)
AST SERPL-CCNC: 27 U/L — SIGNIFICANT CHANGE UP (ref 0–41)
BASOPHILS # BLD AUTO: 0.04 K/UL — SIGNIFICANT CHANGE UP (ref 0–0.2)
BASOPHILS NFR BLD AUTO: 0.4 % — SIGNIFICANT CHANGE UP (ref 0–1)
BILIRUB SERPL-MCNC: 0.9 MG/DL — SIGNIFICANT CHANGE UP (ref 0.2–1.2)
BUN SERPL-MCNC: 16 MG/DL — SIGNIFICANT CHANGE UP (ref 10–20)
CALCIUM SERPL-MCNC: 8.7 MG/DL — SIGNIFICANT CHANGE UP (ref 8.5–10.1)
CHLORIDE SERPL-SCNC: 105 MMOL/L — SIGNIFICANT CHANGE UP (ref 98–110)
CO2 SERPL-SCNC: 24 MMOL/L — SIGNIFICANT CHANGE UP (ref 17–32)
CREAT SERPL-MCNC: 0.9 MG/DL — SIGNIFICANT CHANGE UP (ref 0.7–1.5)
EOSINOPHIL # BLD AUTO: 0.14 K/UL — SIGNIFICANT CHANGE UP (ref 0–0.7)
EOSINOPHIL NFR BLD AUTO: 1.5 % — SIGNIFICANT CHANGE UP (ref 0–8)
GLUCOSE BLDC GLUCOMTR-MCNC: 95 MG/DL — SIGNIFICANT CHANGE UP (ref 70–99)
GLUCOSE SERPL-MCNC: 102 MG/DL — HIGH (ref 70–99)
HCT VFR BLD CALC: 36.1 % — LOW (ref 42–52)
HGB BLD-MCNC: 12.1 G/DL — LOW (ref 14–18)
IMM GRANULOCYTES NFR BLD AUTO: 0.4 % — HIGH (ref 0.1–0.3)
LYMPHOCYTES # BLD AUTO: 2.16 K/UL — SIGNIFICANT CHANGE UP (ref 1.2–3.4)
LYMPHOCYTES # BLD AUTO: 23.8 % — SIGNIFICANT CHANGE UP (ref 20.5–51.1)
MAGNESIUM SERPL-MCNC: 2.2 MG/DL — SIGNIFICANT CHANGE UP (ref 1.8–2.4)
MCHC RBC-ENTMCNC: 30 PG — SIGNIFICANT CHANGE UP (ref 27–31)
MCHC RBC-ENTMCNC: 33.5 G/DL — SIGNIFICANT CHANGE UP (ref 32–37)
MCV RBC AUTO: 89.4 FL — SIGNIFICANT CHANGE UP (ref 80–94)
MONOCYTES # BLD AUTO: 0.96 K/UL — HIGH (ref 0.1–0.6)
MONOCYTES NFR BLD AUTO: 10.6 % — HIGH (ref 1.7–9.3)
NEUTROPHILS # BLD AUTO: 5.72 K/UL — SIGNIFICANT CHANGE UP (ref 1.4–6.5)
NEUTROPHILS NFR BLD AUTO: 63.3 % — SIGNIFICANT CHANGE UP (ref 42.2–75.2)
NRBC # BLD: 0 /100 WBCS — SIGNIFICANT CHANGE UP (ref 0–0)
PLATELET # BLD AUTO: 152 K/UL — SIGNIFICANT CHANGE UP (ref 130–400)
POTASSIUM SERPL-MCNC: 4.1 MMOL/L — SIGNIFICANT CHANGE UP (ref 3.5–5)
POTASSIUM SERPL-SCNC: 4.1 MMOL/L — SIGNIFICANT CHANGE UP (ref 3.5–5)
PROT SERPL-MCNC: 6.6 G/DL — SIGNIFICANT CHANGE UP (ref 6–8)
RBC # BLD: 4.04 M/UL — LOW (ref 4.7–6.1)
RBC # FLD: 13.5 % — SIGNIFICANT CHANGE UP (ref 11.5–14.5)
SODIUM SERPL-SCNC: 140 MMOL/L — SIGNIFICANT CHANGE UP (ref 135–146)
WBC # BLD: 9.06 K/UL — SIGNIFICANT CHANGE UP (ref 4.8–10.8)
WBC # FLD AUTO: 9.06 K/UL — SIGNIFICANT CHANGE UP (ref 4.8–10.8)

## 2021-02-15 PROCEDURE — 99233 SBSQ HOSP IP/OBS HIGH 50: CPT

## 2021-02-15 PROCEDURE — 71045 X-RAY EXAM CHEST 1 VIEW: CPT | Mod: 26

## 2021-02-15 PROCEDURE — 93010 ELECTROCARDIOGRAM REPORT: CPT

## 2021-02-15 RX ORDER — ALPRAZOLAM 0.25 MG
0.5 TABLET ORAL AT BEDTIME
Refills: 0 | Status: DISCONTINUED | OUTPATIENT
Start: 2021-02-15 | End: 2021-02-16

## 2021-02-15 RX ORDER — ACETAMINOPHEN 500 MG
650 TABLET ORAL ONCE
Refills: 0 | Status: COMPLETED | OUTPATIENT
Start: 2021-02-15 | End: 2021-02-15

## 2021-02-15 RX ORDER — METOPROLOL TARTRATE 50 MG
25 TABLET ORAL ONCE
Refills: 0 | Status: COMPLETED | OUTPATIENT
Start: 2021-02-15 | End: 2021-02-15

## 2021-02-15 RX ORDER — POTASSIUM CHLORIDE 20 MEQ
40 PACKET (EA) ORAL ONCE
Refills: 0 | Status: COMPLETED | OUTPATIENT
Start: 2021-02-15 | End: 2021-02-15

## 2021-02-15 RX ADMIN — Medication 325 MILLIGRAM(S): at 12:47

## 2021-02-15 RX ADMIN — Medication 40 MILLIEQUIVALENT(S): at 06:09

## 2021-02-15 RX ADMIN — Medication 650 MILLIGRAM(S): at 11:02

## 2021-02-15 RX ADMIN — Medication 25 MILLIGRAM(S): at 09:12

## 2021-02-15 RX ADMIN — FAMOTIDINE 20 MILLIGRAM(S): 10 INJECTION INTRAVENOUS at 18:10

## 2021-02-15 RX ADMIN — Medication 25 MILLIGRAM(S): at 14:14

## 2021-02-15 RX ADMIN — CHLORHEXIDINE GLUCONATE 1 APPLICATION(S): 213 SOLUTION TOPICAL at 06:10

## 2021-02-15 RX ADMIN — Medication 25 MILLIGRAM(S): at 06:09

## 2021-02-15 RX ADMIN — HEPARIN SODIUM 3000 UNIT(S): 5000 INJECTION INTRAVENOUS; SUBCUTANEOUS at 06:09

## 2021-02-15 RX ADMIN — Medication 0.5 MILLIGRAM(S): at 21:03

## 2021-02-15 RX ADMIN — Medication 5 MILLIGRAM(S): at 06:09

## 2021-02-15 RX ADMIN — FAMOTIDINE 20 MILLIGRAM(S): 10 INJECTION INTRAVENOUS at 06:09

## 2021-02-15 RX ADMIN — POLYETHYLENE GLYCOL 3350 17 GRAM(S): 17 POWDER, FOR SOLUTION ORAL at 12:47

## 2021-02-15 RX ADMIN — HEPARIN SODIUM 3000 UNIT(S): 5000 INJECTION INTRAVENOUS; SUBCUTANEOUS at 18:10

## 2021-02-15 RX ADMIN — Medication 25 MILLIGRAM(S): at 22:03

## 2021-02-15 RX ADMIN — AMLODIPINE BESYLATE 2.5 MILLIGRAM(S): 2.5 TABLET ORAL at 06:10

## 2021-02-15 RX ADMIN — Medication 3 MILLILITER(S): at 21:19

## 2021-02-15 NOTE — PROGRESS NOTE ADULT - SUBJECTIVE AND OBJECTIVE BOX
OPERATIVE PROCEDURE(s):   CABGx3 L-Radial                 POD #  3                       56yMale  SURGEON(s): JEFF Mantilla  SUBJECTIVE ASSESSMENT: pt seen and examined.   Vital Signs Last 24 Hrs  T(F): 97.7 (15 Feb 2021 04:00), Max: 98.8 (14 Feb 2021 12:00)  HR: 87 (15 Feb 2021 07:00) (83 - 115)  BP: 125/81 (15 Feb 2021 07:00) (108/69 - 137/90)  BP(mean): 97 (15 Feb 2021 07:00) (85 - 109)  RR: 19 (15 Feb 2021 07:00) (18 - 29)  SpO2: 99% (15 Feb 2021 07:00) (94% - 99%)    I&O's Detail    14 Feb 2021 07:01  -  15 Feb 2021 07:00  --------------------------------------------------------  IN:    Oral Fluid: 1560 mL  Total IN: 1560 mL    OUT:    Chest Tube (mL): 40 mL    Chest Tube (mL): 70 mL    Indwelling Catheter - Urethral (mL): 1150 mL    Voided (mL): 1350 mL  Total OUT: 2610 mL        Net:   I&O's Detail    13 Feb 2021 07:01  -  14 Feb 2021 07:00  --------------------------------------------------------  Total NET: -2488.5 mL      14 Feb 2021 07:01  -  15 Feb 2021 07:00  --------------------------------------------------------  Total NET: -1050 mL        CAPILLARY BLOOD GLUCOSE      POCT Blood Glucose.: 95 mg/dL (15 Feb 2021 06:58)  POCT Blood Glucose.: 113 mg/dL (14 Feb 2021 21:51)  POCT Blood Glucose.: 97 mg/dL (14 Feb 2021 16:14)  POCT Blood Glucose.: 123 mg/dL (14 Feb 2021 11:19)    Physical Exam:  General: NAD; A&Ox3  Cardiac: +rubs, S1/S2, RRR, no murmur  Lungs: unlabored respirations, CTA b/l, no wheeze, no rales, no crackles  Abdomen: Soft/NT/ND; positive bowel sounds x 4  Sternum: Intact, no click, incision healing well with no drainage  Incisions: Incisions clean/dry/intact  Extremities: Trace edema b/l lower extremities; good capillary refill; no cyanosis; palpable 1+ pedal pulses b/l    Central Venous Catheter: Yes[x]  No[] , If Yes indication:     hd unstable      Day #  3  Younger Catheter: Yes  [x] , No  [] , If yes indication:    strict i/o                  Day #  3  NGT: Yes [] No [x] ,    If Yes Placement:                                     Day #    EPICARDIAL WIRES:  [x] YES [] NO                                              Day # 3  BOWEL MOVEMENT:  [] YES [x] NO, If No, Timing since last BM:      Day #    CHEST TUBE(Left/Right):  [] YES [x] NO, If yes -  AIR LEAKS:  [] YES [x] NO      LABS:                        12.1<L>  9.06  )-----------( 152      ( 15 Feb 2021 02:40 )             36.1<L>                        12.1<L>  8.83  )-----------( 149      ( 14 Feb 2021 07:29 )             36.2<L>    02-15    140  |  105  |  16  ----------------------------<  102<H>  4.1   |  24  |  0.9  02-14    138  |  102  |  13  ----------------------------<  90  4.5   |  26  |  1.1    Ca    8.7      15 Feb 2021 02:40  Mg     2.2     02-15    TPro  6.6 [6.0 - 8.0]  /  Alb  4.2 [3.5 - 5.2]  /  TBili  0.9 [0.2 - 1.2]  /  DBili  x   /  AST  27 [0 - 41]  /  ALT  13 [0 - 41]  /  AlkPhos  34 [30 - 115]  02-15    PT/INR - ( 14 Feb 2021 07:29 )   PT: ;   INR: 1.37 ratio         PTT - ( 14 Feb 2021 07:29 )  PTT:31.6 sec    ABG - ( 14 Feb 2021 03:44 )  pH: 7.50  /  pCO2: 35    /  pO2: 60    / HCO3: 27    / Base Excess: 3.8   /  SaO2: 93    /  LA: 0.7              RADIOLOGY & ADDITIONAL TESTS:  CXR: < from: Xray Chest 1 View- PORTABLE-Routine (02.14.21 @ 05:05) >  Impression:    Status post a median sternotomy.    No acute infiltrates.    Support tubes and lines as above.    < end of copied text >    EKG: < from: 12 Lead ECG (02.14.21 @ 08:32) >  Ventricular Rate 113 BPM    Atrial Rate 113 BPM    P-R Interval 134 ms    QRS Duration 130 ms    Q-T Interval 376 ms    QTC Calculation(Bazett) 515 ms    P Axis 22 degrees    R Axis 20 degrees    T Axis -7 degrees    Diagnosis Line Sinus tachycardia  Right bundle branch block  T wave abnormality, consider inferior ischemia  Abnormal ECG    < end of copied text >    MEDICATIONS  (STANDING):  albuterol/ipratropium for Nebulization 3 milliLiter(s) Nebulizer every 6 hours  amLODIPine   Tablet 2.5 milliGRAM(s) Oral daily  aspirin enteric coated 325 milliGRAM(s) Oral daily  bisacodyl 5 milliGRAM(s) Oral every 12 hours  chlorhexidine 4% Liquid 1 Application(s) Topical <User Schedule>  dextrose 40% Gel 15 Gram(s) Oral once  dextrose 5%. 1000 milliLiter(s) (50 mL/Hr) IV Continuous <Continuous>  dextrose 5%. 1000 milliLiter(s) (100 mL/Hr) IV Continuous <Continuous>  dextrose 50% Injectable 25 Gram(s) IV Push once  dextrose 50% Injectable 12.5 Gram(s) IV Push once  dextrose 50% Injectable 25 Gram(s) IV Push once  famotidine    Tablet 20 milliGRAM(s) Oral two times a day  glucagon  Injectable 1 milliGRAM(s) IntraMuscular once  heparin   Injectable 3000 Unit(s) SubCutaneous every 12 hours  insulin lispro (ADMELOG) corrective regimen sliding scale   SubCutaneous three times a day before meals  meperidine     Injectable 25 milliGRAM(s) IV Push once  metoprolol tartrate 25 milliGRAM(s) Oral every 8 hours  polyethylene glycol 3350 17 Gram(s) Oral daily  senna 2 Tablet(s) Oral at bedtime  sodium chloride 0.9% Bolus 300 milliLiter(s) IV Bolus once  sodium chloride 0.9%. 1000 milliLiter(s) (20 mL/Hr) IV Continuous <Continuous>  sodium chloride 0.9%. 1000 milliLiter(s) (75 mL/Hr) IV Continuous <Continuous>    MEDICATIONS  (PRN):  fentaNYL    Injectable 50 MICROGram(s) IV Push once PRN Moderate Pain (4 - 6)  ondansetron  IVPB 4 milliGRAM(s) IV Intermittent once PRN Nausea and/or Vomiting  oxyCODONE    IR 5 milliGRAM(s) Oral every 4 hours PRN Moderate Pain (4 - 6)  oxyCODONE    IR 10 milliGRAM(s) Oral every 4 hours PRN Severe Pain (7 - 10)    HEPARIN:  [x] YES [] NO  Dose: 3000 UNITS Q12H    SCD's: YES b/l  GI Prophylaxis: Protonix [], Pepcid [x], None [], (Contra-indication:.....)    Post-Op Beta-Blockers: Yes [x], No[], If No, then contraindication:   Post-Op Aspirin: Yes [x],  No [], If No, then contraindication:  Post-Op Statin: Yes [], No[x], If No, then contraindication: elevated lfts  Allergies    No Known Allergies    Intolerances      Ambulation/Activity Status: ambulate     Assessment/Plan:  56y Male status-post .....  - Case and plan discussed with CTU Intensivist and CT Surgeon - Dr. Higgins/Bambi/Jose Rafael  - Continue CTU supportive care    - Continue DVT/GI prophylaxis  - Incentive Spirometry 10 times an hour  - Continue to advance physical activity as tolerated and continue PT/OT as directed  1. CAD: Continue ASA, statin, BB  2. HTN:   3. A. Fib:   4. COPD/Hypoxia:   5. DM/Glucose Control:     Social Service Disposition:     OPERATIVE PROCEDURE(s):   CABGx3 L-Radial                 POD #  3                       56yMale  SURGEON(s): JEFF Mantilla  SUBJECTIVE ASSESSMENT: pt seen and examined. no acute complaints   Vital Signs Last 24 Hrs  T(F): 97.7 (15 Feb 2021 04:00), Max: 98.8 (14 Feb 2021 12:00)  HR: 87 (15 Feb 2021 07:00) (83 - 115)  BP: 125/81 (15 Feb 2021 07:00) (108/69 - 137/90)  BP(mean): 97 (15 Feb 2021 07:00) (85 - 109)  RR: 19 (15 Feb 2021 07:00) (18 - 29)  SpO2: 99% (15 Feb 2021 07:00) (94% - 99%)    I&O's Detail    14 Feb 2021 07:01  -  15 Feb 2021 07:00  --------------------------------------------------------  IN:    Oral Fluid: 1560 mL  Total IN: 1560 mL    OUT:    Chest Tube (mL): 40 mL    Chest Tube (mL): 70 mL    Indwelling Catheter - Urethral (mL): 1150 mL    Voided (mL): 1350 mL  Total OUT: 2610 mL        Net:   I&O's Detail    13 Feb 2021 07:01  -  14 Feb 2021 07:00  --------------------------------------------------------  Total NET: -2488.5 mL      14 Feb 2021 07:01  -  15 Feb 2021 07:00  --------------------------------------------------------  Total NET: -1050 mL        CAPILLARY BLOOD GLUCOSE      POCT Blood Glucose.: 95 mg/dL (15 Feb 2021 06:58)  POCT Blood Glucose.: 113 mg/dL (14 Feb 2021 21:51)  POCT Blood Glucose.: 97 mg/dL (14 Feb 2021 16:14)  POCT Blood Glucose.: 123 mg/dL (14 Feb 2021 11:19)    Physical Exam:  General: NAD; A&Ox3  Cardiac: no rubs, S1/S2, RRR, no murmur  Lungs: unlabored respirations, CTA b/l, no wheeze, no rales, no crackles  Abdomen: Soft/NT/ND; positive bowel sounds x 4  Sternum: Intact, no click, incision healing well with no drainage  Incisions: Incisions clean/dry/intact  Extremities: Trace edema b/l lower extremities; good capillary refill; no cyanosis; palpable 1+ pedal pulses b/l    Central Venous Catheter: Yes[x]  No[] , If Yes indication:     hd unstable      Day #  3- d/c today  Younger Catheter: Yes  [] , No  [] , If yes indication:                      Day #    NGT: Yes [] No [x] ,    If Yes Placement:                                     Day #    EPICARDIAL WIRES:  [x] YES [] NO                                              Day # 3  BOWEL MOVEMENT:  [x] YES [] NO, If No, Timing since last BM:      Day #    CHEST TUBE(Left/Right):  [] YES [x] NO, If yes -  AIR LEAKS:  [] YES [x] NO      LABS:                        12.1<L>  9.06  )-----------( 152      ( 15 Feb 2021 02:40 )             36.1<L>                        12.1<L>  8.83  )-----------( 149      ( 14 Feb 2021 07:29 )             36.2<L>    02-15    140  |  105  |  16  ----------------------------<  102<H>  4.1   |  24  |  0.9  02-14    138  |  102  |  13  ----------------------------<  90  4.5   |  26  |  1.1    Ca    8.7      15 Feb 2021 02:40  Mg     2.2     02-15    TPro  6.6 [6.0 - 8.0]  /  Alb  4.2 [3.5 - 5.2]  /  TBili  0.9 [0.2 - 1.2]  /  DBili  x   /  AST  27 [0 - 41]  /  ALT  13 [0 - 41]  /  AlkPhos  34 [30 - 115]  02-15    PT/INR - ( 14 Feb 2021 07:29 )   PT: ;   INR: 1.37 ratio         PTT - ( 14 Feb 2021 07:29 )  PTT:31.6 sec    ABG - ( 14 Feb 2021 03:44 )  pH: 7.50  /  pCO2: 35    /  pO2: 60    / HCO3: 27    / Base Excess: 3.8   /  SaO2: 93    /  LA: 0.7              RADIOLOGY & ADDITIONAL TESTS:  CXR: < from: Xray Chest 1 View- PORTABLE-Routine (02.14.21 @ 05:05) >  Impression:    Status post a median sternotomy.    No acute infiltrates.    Support tubes and lines as above.    < end of copied text >    EKG: < from: 12 Lead ECG (02.14.21 @ 08:32) >  Ventricular Rate 113 BPM    Atrial Rate 113 BPM    P-R Interval 134 ms    QRS Duration 130 ms    Q-T Interval 376 ms    QTC Calculation(Bazett) 515 ms    P Axis 22 degrees    R Axis 20 degrees    T Axis -7 degrees    Diagnosis Line Sinus tachycardia  Right bundle branch block  T wave abnormality, consider inferior ischemia  Abnormal ECG    < end of copied text >    MEDICATIONS  (STANDING):  albuterol/ipratropium for Nebulization 3 milliLiter(s) Nebulizer every 6 hours  amLODIPine   Tablet 2.5 milliGRAM(s) Oral daily  aspirin enteric coated 325 milliGRAM(s) Oral daily  bisacodyl 5 milliGRAM(s) Oral every 12 hours  chlorhexidine 4% Liquid 1 Application(s) Topical <User Schedule>  dextrose 40% Gel 15 Gram(s) Oral once  dextrose 5%. 1000 milliLiter(s) (50 mL/Hr) IV Continuous <Continuous>  dextrose 5%. 1000 milliLiter(s) (100 mL/Hr) IV Continuous <Continuous>  dextrose 50% Injectable 25 Gram(s) IV Push once  dextrose 50% Injectable 12.5 Gram(s) IV Push once  dextrose 50% Injectable 25 Gram(s) IV Push once  famotidine    Tablet 20 milliGRAM(s) Oral two times a day  glucagon  Injectable 1 milliGRAM(s) IntraMuscular once  heparin   Injectable 3000 Unit(s) SubCutaneous every 12 hours  insulin lispro (ADMELOG) corrective regimen sliding scale   SubCutaneous three times a day before meals  meperidine     Injectable 25 milliGRAM(s) IV Push once  metoprolol tartrate 25 milliGRAM(s) Oral every 8 hours  polyethylene glycol 3350 17 Gram(s) Oral daily  senna 2 Tablet(s) Oral at bedtime  sodium chloride 0.9% Bolus 300 milliLiter(s) IV Bolus once  sodium chloride 0.9%. 1000 milliLiter(s) (20 mL/Hr) IV Continuous <Continuous>  sodium chloride 0.9%. 1000 milliLiter(s) (75 mL/Hr) IV Continuous <Continuous>    MEDICATIONS  (PRN):  fentaNYL    Injectable 50 MICROGram(s) IV Push once PRN Moderate Pain (4 - 6)  ondansetron  IVPB 4 milliGRAM(s) IV Intermittent once PRN Nausea and/or Vomiting  oxyCODONE    IR 5 milliGRAM(s) Oral every 4 hours PRN Moderate Pain (4 - 6)  oxyCODONE    IR 10 milliGRAM(s) Oral every 4 hours PRN Severe Pain (7 - 10)    HEPARIN:  [x] YES [] NO  Dose: 3000 UNITS Q12H    SCD's: YES b/l  GI Prophylaxis: Protonix [], Pepcid [x], None [], (Contra-indication:.....)    Post-Op Beta-Blockers: Yes [x], No[], If No, then contraindication:   Post-Op Aspirin: Yes [x],  No [], If No, then contraindication:  Post-Op Statin: Yes [], No[x], If No, then contraindication: elevated lfts  Allergies    No Known Allergies    Intolerances      Ambulation/Activity Status: ambulate     Assessment/Plan:  56y Male status-post   CABGx3 L-Radial        POD #       3  - Case and plan discussed with CTU Intensivist and CT Surgeon - Dr. Mantilla  - Continue CTU supportive care    - Continue DVT/GI prophylaxis  - Incentive Spirometry 10 times an hour  - Continue to advance physical activity as tolerated and continue PT/OT as directed  - Discharged femoral a-line   1. CAD: Continue ASA, statin, BB, norvasc to prevent radial vasospasm, pain control  2. HTN: Cont bb, norvasc   3. DM/Glucose Control: Continue Insulin sliding scale  4. post-op hypoxia: cont nebs, mucinex, wean o2, encourage incentive spirometry    dispo: home

## 2021-02-15 NOTE — PROGRESS NOTE ADULT - SUBJECTIVE AND OBJECTIVE BOX
OPERATIVE PROCEDURE(s):   CABGx3 L-Radial                 POD #  3                       56yMale  SURGEON(s): JEFF Mantilla  SUBJECTIVE ASSESSMENT: pt seen and examined. no acute complaints   Vital Signs Last 24 Hrs  T(F): 97.7 (15 Feb 2021 04:00), Max: 98.8 (14 Feb 2021 12:00)  HR: 87 (15 Feb 2021 07:00) (83 - 115)  BP: 125/81 (15 Feb 2021 07:00) (108/69 - 137/90)  BP(mean): 97 (15 Feb 2021 07:00) (85 - 109)  RR: 19 (15 Feb 2021 07:00) (18 - 29)  SpO2: 99% (15 Feb 2021 07:00) (94% - 99%)    I&O's Detail    14 Feb 2021 07:01  -  15 Feb 2021 07:00  --------------------------------------------------------  IN:    Oral Fluid: 1560 mL  Total IN: 1560 mL    OUT:    Chest Tube (mL): 40 mL    Chest Tube (mL): 70 mL    Indwelling Catheter - Urethral (mL): 1150 mL    Voided (mL): 1350 mL  Total OUT: 2610 mL        Net:   I&O's Detail    13 Feb 2021 07:01  -  14 Feb 2021 07:00  --------------------------------------------------------  Total NET: -2488.5 mL      14 Feb 2021 07:01  -  15 Feb 2021 07:00  --------------------------------------------------------  Total NET: -1050 mL        CAPILLARY BLOOD GLUCOSE      POCT Blood Glucose.: 95 mg/dL (15 Feb 2021 06:58)  POCT Blood Glucose.: 113 mg/dL (14 Feb 2021 21:51)  POCT Blood Glucose.: 97 mg/dL (14 Feb 2021 16:14)  POCT Blood Glucose.: 123 mg/dL (14 Feb 2021 11:19)    Physical Exam:  General: NAD; A&Ox3  Cardiac: no rubs, S1/S2, RRR, no murmur  Lungs: unlabored respirations, CTA b/l, no wheeze, no rales, no crackles  Abdomen: Soft/NT/ND; positive bowel sounds x 4  Sternum: Intact, no click, incision healing well with no drainage  Incisions: Incisions clean/dry/intact  Extremities: Trace edema b/l lower extremities; good capillary refill; no cyanosis; palpable 1+ pedal pulses b/l    Central Venous Catheter: Yes[x]  No[] , If Yes indication:     hd unstable      Day #  3- d/c today  Younger Catheter: Yes  [] , No  [] , If yes indication:                      Day #    NGT: Yes [] No [x] ,    If Yes Placement:                                     Day #    EPICARDIAL WIRES:  [x] YES [] NO                                              Day # 3  BOWEL MOVEMENT:  [x] YES [] NO, If No, Timing since last BM:      Day #    CHEST TUBE(Left/Right):  [] YES [x] NO, If yes -  AIR LEAKS:  [] YES [x] NO      LABS:                        12.1<L>  9.06  )-----------( 152      ( 15 Feb 2021 02:40 )             36.1<L>                        12.1<L>  8.83  )-----------( 149      ( 14 Feb 2021 07:29 )             36.2<L>    02-15    140  |  105  |  16  ----------------------------<  102<H>  4.1   |  24  |  0.9  02-14    138  |  102  |  13  ----------------------------<  90  4.5   |  26  |  1.1    Ca    8.7      15 Feb 2021 02:40  Mg     2.2     02-15    TPro  6.6 [6.0 - 8.0]  /  Alb  4.2 [3.5 - 5.2]  /  TBili  0.9 [0.2 - 1.2]  /  DBili  x   /  AST  27 [0 - 41]  /  ALT  13 [0 - 41]  /  AlkPhos  34 [30 - 115]  02-15    PT/INR - ( 14 Feb 2021 07:29 )   PT: ;   INR: 1.37 ratio         PTT - ( 14 Feb 2021 07:29 )  PTT:31.6 sec    ABG - ( 14 Feb 2021 03:44 )  pH: 7.50  /  pCO2: 35    /  pO2: 60    / HCO3: 27    / Base Excess: 3.8   /  SaO2: 93    /  LA: 0.7              RADIOLOGY & ADDITIONAL TESTS:  CXR: < from: Xray Chest 1 View- PORTABLE-Routine (02.14.21 @ 05:05) >  Impression:    Status post a median sternotomy.    No acute infiltrates.    Support tubes and lines as above.    < end of copied text >    EKG: < from: 12 Lead ECG (02.14.21 @ 08:32) >  Ventricular Rate 113 BPM    Atrial Rate 113 BPM    P-R Interval 134 ms    QRS Duration 130 ms    Q-T Interval 376 ms    QTC Calculation(Bazett) 515 ms    P Axis 22 degrees    R Axis 20 degrees    T Axis -7 degrees    Diagnosis Line Sinus tachycardia  Right bundle branch block  T wave abnormality, consider inferior ischemia  Abnormal ECG    < end of copied text >    MEDICATIONS  (STANDING):  albuterol/ipratropium for Nebulization 3 milliLiter(s) Nebulizer every 6 hours  amLODIPine   Tablet 2.5 milliGRAM(s) Oral daily  aspirin enteric coated 325 milliGRAM(s) Oral daily  bisacodyl 5 milliGRAM(s) Oral every 12 hours  chlorhexidine 4% Liquid 1 Application(s) Topical <User Schedule>  dextrose 40% Gel 15 Gram(s) Oral once  dextrose 5%. 1000 milliLiter(s) (50 mL/Hr) IV Continuous <Continuous>  dextrose 5%. 1000 milliLiter(s) (100 mL/Hr) IV Continuous <Continuous>  dextrose 50% Injectable 25 Gram(s) IV Push once  dextrose 50% Injectable 12.5 Gram(s) IV Push once  dextrose 50% Injectable 25 Gram(s) IV Push once  famotidine    Tablet 20 milliGRAM(s) Oral two times a day  glucagon  Injectable 1 milliGRAM(s) IntraMuscular once  heparin   Injectable 3000 Unit(s) SubCutaneous every 12 hours  insulin lispro (ADMELOG) corrective regimen sliding scale   SubCutaneous three times a day before meals  meperidine     Injectable 25 milliGRAM(s) IV Push once  metoprolol tartrate 25 milliGRAM(s) Oral every 8 hours  polyethylene glycol 3350 17 Gram(s) Oral daily  senna 2 Tablet(s) Oral at bedtime  sodium chloride 0.9% Bolus 300 milliLiter(s) IV Bolus once  sodium chloride 0.9%. 1000 milliLiter(s) (20 mL/Hr) IV Continuous <Continuous>  sodium chloride 0.9%. 1000 milliLiter(s) (75 mL/Hr) IV Continuous <Continuous>    MEDICATIONS  (PRN):  fentaNYL    Injectable 50 MICROGram(s) IV Push once PRN Moderate Pain (4 - 6)  ondansetron  IVPB 4 milliGRAM(s) IV Intermittent once PRN Nausea and/or Vomiting  oxyCODONE    IR 5 milliGRAM(s) Oral every 4 hours PRN Moderate Pain (4 - 6)  oxyCODONE    IR 10 milliGRAM(s) Oral every 4 hours PRN Severe Pain (7 - 10)    HEPARIN:  [x] YES [] NO  Dose: 3000 UNITS Q12H    SCD's: YES b/l  GI Prophylaxis: Protonix [], Pepcid [x], None [], (Contra-indication:.....)    Post-Op Beta-Blockers: Yes [x], No[], If No, then contraindication:   Post-Op Aspirin: Yes [x],  No [], If No, then contraindication:  Post-Op Statin: Yes [], No[x], If No, then contraindication: elevated lfts  Allergies    No Known Allergies    Intolerances      Ambulation/Activity Status: ambulate     Assessment/Plan:  56y Male status-post   CABGx3 L-Radial        POD #       3  - Case and plan discussed with CTU PA and CT Surgeon - Dr. Mantilla  - Continue CTU supportive care    - Continue DVT/GI prophylaxis  - Incentive Spirometry 10 times an hour  - Continue to advance physical activity as tolerated and continue PT/OT as directed  - Discharged femoral a-line   1. CAD: Continue ASA, statin, BB, norvasc to prevent radial vasospasm, pain control  2. HTN: Cont bb, norvasc   3. DM/Glucose Control: Continue Insulin sliding scale  4. post-op hypoxia: cont nebs, mucinex, wean o2, encourage incentive spirometry

## 2021-02-16 ENCOUNTER — TRANSCRIPTION ENCOUNTER (OUTPATIENT)
Age: 56
End: 2021-02-16

## 2021-02-16 VITALS
TEMPERATURE: 99 F | HEART RATE: 99 BPM | DIASTOLIC BLOOD PRESSURE: 63 MMHG | OXYGEN SATURATION: 97 % | RESPIRATION RATE: 26 BRPM | SYSTOLIC BLOOD PRESSURE: 117 MMHG

## 2021-02-16 PROBLEM — I10 ESSENTIAL (PRIMARY) HYPERTENSION: Chronic | Status: ACTIVE | Noted: 2021-02-11

## 2021-02-16 PROBLEM — E78.5 HYPERLIPIDEMIA, UNSPECIFIED: Chronic | Status: ACTIVE | Noted: 2021-02-11

## 2021-02-16 LAB
ALBUMIN SERPL ELPH-MCNC: 4 G/DL — SIGNIFICANT CHANGE UP (ref 3.5–5.2)
ALP SERPL-CCNC: 38 U/L — SIGNIFICANT CHANGE UP (ref 30–115)
ALT FLD-CCNC: 16 U/L — SIGNIFICANT CHANGE UP (ref 0–41)
ANION GAP SERPL CALC-SCNC: 10 MMOL/L — SIGNIFICANT CHANGE UP (ref 7–14)
AST SERPL-CCNC: 24 U/L — SIGNIFICANT CHANGE UP (ref 0–41)
BASOPHILS # BLD AUTO: 0.05 K/UL — SIGNIFICANT CHANGE UP (ref 0–0.2)
BASOPHILS NFR BLD AUTO: 0.6 % — SIGNIFICANT CHANGE UP (ref 0–1)
BILIRUB SERPL-MCNC: 0.8 MG/DL — SIGNIFICANT CHANGE UP (ref 0.2–1.2)
BUN SERPL-MCNC: 20 MG/DL — SIGNIFICANT CHANGE UP (ref 10–20)
CALCIUM SERPL-MCNC: 8.4 MG/DL — LOW (ref 8.5–10.1)
CHLORIDE SERPL-SCNC: 103 MMOL/L — SIGNIFICANT CHANGE UP (ref 98–110)
CO2 SERPL-SCNC: 25 MMOL/L — SIGNIFICANT CHANGE UP (ref 17–32)
CREAT SERPL-MCNC: 1 MG/DL — SIGNIFICANT CHANGE UP (ref 0.7–1.5)
EOSINOPHIL # BLD AUTO: 0.25 K/UL — SIGNIFICANT CHANGE UP (ref 0–0.7)
EOSINOPHIL NFR BLD AUTO: 2.9 % — SIGNIFICANT CHANGE UP (ref 0–8)
GLUCOSE BLDC GLUCOMTR-MCNC: 120 MG/DL — HIGH (ref 70–99)
GLUCOSE SERPL-MCNC: 94 MG/DL — SIGNIFICANT CHANGE UP (ref 70–99)
HCT VFR BLD CALC: 36.5 % — LOW (ref 42–52)
HGB BLD-MCNC: 12.5 G/DL — LOW (ref 14–18)
IMM GRANULOCYTES NFR BLD AUTO: 0.6 % — HIGH (ref 0.1–0.3)
LYMPHOCYTES # BLD AUTO: 2.96 K/UL — SIGNIFICANT CHANGE UP (ref 1.2–3.4)
LYMPHOCYTES # BLD AUTO: 34.9 % — SIGNIFICANT CHANGE UP (ref 20.5–51.1)
MAGNESIUM SERPL-MCNC: 2.1 MG/DL — SIGNIFICANT CHANGE UP (ref 1.8–2.4)
MCHC RBC-ENTMCNC: 30.1 PG — SIGNIFICANT CHANGE UP (ref 27–31)
MCHC RBC-ENTMCNC: 34.2 G/DL — SIGNIFICANT CHANGE UP (ref 32–37)
MCV RBC AUTO: 88 FL — SIGNIFICANT CHANGE UP (ref 80–94)
MONOCYTES # BLD AUTO: 0.86 K/UL — HIGH (ref 0.1–0.6)
MONOCYTES NFR BLD AUTO: 10.1 % — HIGH (ref 1.7–9.3)
NEUTROPHILS # BLD AUTO: 4.31 K/UL — SIGNIFICANT CHANGE UP (ref 1.4–6.5)
NEUTROPHILS NFR BLD AUTO: 50.9 % — SIGNIFICANT CHANGE UP (ref 42.2–75.2)
NRBC # BLD: 0 /100 WBCS — SIGNIFICANT CHANGE UP (ref 0–0)
PLATELET # BLD AUTO: 173 K/UL — SIGNIFICANT CHANGE UP (ref 130–400)
POTASSIUM SERPL-MCNC: 4.3 MMOL/L — SIGNIFICANT CHANGE UP (ref 3.5–5)
POTASSIUM SERPL-SCNC: 4.3 MMOL/L — SIGNIFICANT CHANGE UP (ref 3.5–5)
PROT SERPL-MCNC: 6.5 G/DL — SIGNIFICANT CHANGE UP (ref 6–8)
RBC # BLD: 4.15 M/UL — LOW (ref 4.7–6.1)
RBC # FLD: 13.5 % — SIGNIFICANT CHANGE UP (ref 11.5–14.5)
SODIUM SERPL-SCNC: 138 MMOL/L — SIGNIFICANT CHANGE UP (ref 135–146)
WBC # BLD: 8.48 K/UL — SIGNIFICANT CHANGE UP (ref 4.8–10.8)
WBC # FLD AUTO: 8.48 K/UL — SIGNIFICANT CHANGE UP (ref 4.8–10.8)

## 2021-02-16 PROCEDURE — 71046 X-RAY EXAM CHEST 2 VIEWS: CPT | Mod: 26

## 2021-02-16 PROCEDURE — 99232 SBSQ HOSP IP/OBS MODERATE 35: CPT

## 2021-02-16 PROCEDURE — 93010 ELECTROCARDIOGRAM REPORT: CPT

## 2021-02-16 RX ORDER — FAMOTIDINE 10 MG/ML
1 INJECTION INTRAVENOUS
Qty: 60 | Refills: 0
Start: 2021-02-16 | End: 2021-03-17

## 2021-02-16 RX ORDER — AMLODIPINE BESYLATE 2.5 MG/1
1 TABLET ORAL
Qty: 0 | Refills: 0 | DISCHARGE

## 2021-02-16 RX ORDER — NEBIVOLOL HYDROCHLORIDE 5 MG/1
1 TABLET ORAL
Qty: 0 | Refills: 0 | DISCHARGE

## 2021-02-16 RX ORDER — POLYETHYLENE GLYCOL 3350 17 G/17G
17 POWDER, FOR SOLUTION ORAL
Qty: 510 | Refills: 0
Start: 2021-02-16 | End: 2021-03-17

## 2021-02-16 RX ORDER — METOPROLOL TARTRATE 50 MG
1 TABLET ORAL
Qty: 90 | Refills: 0
Start: 2021-02-16 | End: 2021-03-17

## 2021-02-16 RX ORDER — AMLODIPINE BESYLATE 2.5 MG/1
1 TABLET ORAL
Qty: 30 | Refills: 0
Start: 2021-02-16 | End: 2021-03-17

## 2021-02-16 RX ORDER — ASPIRIN/CALCIUM CARB/MAGNESIUM 324 MG
1 TABLET ORAL
Qty: 30 | Refills: 0
Start: 2021-02-16 | End: 2021-03-17

## 2021-02-16 RX ORDER — ISOSORBIDE MONONITRATE 60 MG/1
1 TABLET, EXTENDED RELEASE ORAL
Qty: 0 | Refills: 0 | DISCHARGE

## 2021-02-16 RX ADMIN — Medication 325 MILLIGRAM(S): at 11:44

## 2021-02-16 RX ADMIN — CHLORHEXIDINE GLUCONATE 1 APPLICATION(S): 213 SOLUTION TOPICAL at 06:07

## 2021-02-16 RX ADMIN — Medication 25 MILLIGRAM(S): at 06:07

## 2021-02-16 RX ADMIN — FAMOTIDINE 20 MILLIGRAM(S): 10 INJECTION INTRAVENOUS at 06:07

## 2021-02-16 RX ADMIN — Medication 25 MILLIGRAM(S): at 13:01

## 2021-02-16 RX ADMIN — AMLODIPINE BESYLATE 2.5 MILLIGRAM(S): 2.5 TABLET ORAL at 06:07

## 2021-02-16 NOTE — PROGRESS NOTE ADULT - SUBJECTIVE AND OBJECTIVE BOX
OPERATIVE PROCEDURE(s):                POD #                       56yMale  SURGEON(s): JEFF Mantilla  SUBJECTIVE ASSESSMENT:  Patient has no complaints at this time.    Vital Signs Last 24 Hrs  T(F): 98.9 (2021 00:00), Max: 99.3 (15 Feb 2021 16:00)  HR: 90 (2021 06:00) (78 - 107)  BP: 119/73 (2021 04:00) (110/68 - 129/80)  BP(mean): 91 (2021 04:00) (85 - 98)  ABP: --  ABP(mean): --  RR: 22 (2021 06:00) (16 - 36)  SpO2: 97% (2021 06:00) (96% - 99%)  CVP(mm Hg): --  CVP(cm H2O): --  CO: --  CI: --  PA: --  SVR: --    I&O's Detail    2021 07:01  -  15 Feb 2021 07:00  --------------------------------------------------------  IN:    Oral Fluid: 1560 mL  Total IN: 1560 mL    OUT:    Chest Tube (mL): 40 mL    Chest Tube (mL): 70 mL    Indwelling Catheter - Urethral (mL): 1150 mL    Voided (mL): 1350 mL  Total OUT: 2610 mL        Net:   I&O's Detail    2021 07:01  -  2021 07:00  --------------------------------------------------------  Total NET: -2488.5 mL      2021 07:01  -  15 Feb 2021 07:00  --------------------------------------------------------  Total NET: -1050 mL        CAPILLARY BLOOD GLUCOSE      POCT Blood Glucose.: 95 mg/dL (15 Feb 2021 06:58)    Physical Exam:  General: NAD; A&Ox3/Patient is intubated and sedated  Cardiac: S1/S2, RRR, no murmur, no rubs  Lungs: unlabored respirations, CTA b/l, no wheeze, no rales, no crackles  Abdomen: Soft/NT/ND; positive bowel sounds x 4  Sternum: Intact, no click, incision healing well with no drainage  Incisions: Incisions clean/dry/intact  Extremities: No edema b/l lower extremities; good capillary refill; no cyanosis; palpable 1+ pedal pulses b/l    Central Venous Catheter: Yes[]  No[] , If Yes indication:           Day #  Younger Catheter: Yes  [] , No  [] , If yes indication:                      Day #  NGT: Yes [] No [] ,    If Yes Placement:                                     Day #  EPICARDIAL WIRES:  [] YES [] NO                                              Day #  BOWEL MOVEMENT:  [] YES [] NO, If No, Timing since last BM:      Day #  CHEST TUBE(Left/Right):  [] YES [] NO, If yes -  AIR LEAKS:  [] YES [] NO        LABS:                        12.5<L>  8.48  )-----------( 173      ( 2021 01:14 )             36.5<L>                        12.1<L>  9.06  )-----------( 152      ( 15 Feb 2021 02:40 )             36.1<L>    02-16    138  |  103  |  20  ----------------------------<  94  4.3   |  25  |  1.0  02-15    140  |  105  |  16  ----------------------------<  102<H>  4.1   |  24  |  0.9    Ca    8.4<L>      2021 01:14  Mg     2.1     02-16    TPro  6.5 [6.0 - 8.0]  /  Alb  4.0 [3.5 - 5.2]  /  TBili  0.8 [0.2 - 1.2]  /  DBili  x   /  AST  24 [0 - 41]  /  ALT  16 [0 - 41]  /  AlkPhos  38 [30 - 115]  02-16    PT/INR - ( 2021 07:29 )   PT: ;   INR: 1.37 ratio         PTT - ( 2021 07:29 )  PTT:31.6 sec      RADIOLOGY & ADDITIONAL TESTS:  CXR:   EK Lead ECG:   Ventricular Rate 93 BPM    Atrial Rate 93 BPM    P-R Interval 146 ms    QRS Duration 124 ms    Q-T Interval 384 ms    QTC Calculation(Bazett) 477 ms    P Axis 21 degrees    R Axis 35 degrees    T Axis -4 degrees    Diagnosis Line *** Poor data quality, interpretation may be adversely affected  Normal sinus rhythm  Right bundle branch block  T wave abnormality, consider inferior ischemia  Abnormal ECG    Confirmed by SHELBY TORIBIO MD ([id number]) on 2/15/2021 8:29:55 AM (02-15-21 @ 07:19)    MEDICATIONS  (STANDING):  albuterol/ipratropium for Nebulization 3 milliLiter(s) Nebulizer every 6 hours  amLODIPine   Tablet 2.5 milliGRAM(s) Oral daily  aspirin enteric coated 325 milliGRAM(s) Oral daily  chlorhexidine 4% Liquid 1 Application(s) Topical <User Schedule>  dextrose 5%. 1000 milliLiter(s) (50 mL/Hr) IV Continuous <Continuous>  dextrose 5%. 1000 milliLiter(s) (100 mL/Hr) IV Continuous <Continuous>  famotidine    Tablet 20 milliGRAM(s) Oral two times a day  glucagon  Injectable 1 milliGRAM(s) IntraMuscular once  heparin   Injectable 3000 Unit(s) SubCutaneous every 12 hours  meperidine     Injectable 25 milliGRAM(s) IV Push once  metoprolol tartrate 25 milliGRAM(s) Oral every 8 hours  polyethylene glycol 3350 17 Gram(s) Oral daily  sodium chloride 0.9% Bolus 300 milliLiter(s) IV Bolus once  sodium chloride 0.9%. 1000 milliLiter(s) (20 mL/Hr) IV Continuous <Continuous>  sodium chloride 0.9%. 1000 milliLiter(s) (75 mL/Hr) IV Continuous <Continuous>    MEDICATIONS  (PRN):  ALPRAZolam 0.5 milliGRAM(s) Oral at bedtime PRN insomnia  fentaNYL    Injectable 50 MICROGram(s) IV Push once PRN Moderate Pain (4 - 6)  ondansetron  IVPB 4 milliGRAM(s) IV Intermittent once PRN Nausea and/or Vomiting  oxyCODONE    IR 5 milliGRAM(s) Oral every 4 hours PRN Moderate Pain (4 - 6)  oxyCODONE    IR 10 milliGRAM(s) Oral every 4 hours PRN Severe Pain (7 - 10)    HEPARIN:  [] YES [] NO  Dose: XX UNITS/HR UNITS Q8H  LOVENOX:[] YES [] NO  Dose: XX mg Q24H  COUMADIN: []  YES [] NO  Dose: XX mg  Q24H  SCD's: YES b/l  GI Prophylaxis: Protonix [], Pepcid [], None [], (Contra-indication:.....)    Post-Op Aspirin: Yes [],  No [], If No, then contra-indication:  Post-Op Statin: Yes [], No[], If No, then contra-indication:  Post-Op Beta-Blockers: Yes [], No [], If No, then contra-indication:    Allergies:  No Known Allergies      Ambulation/Activity Status: Ambulates several times daily without assistance.    Assessment/Plan:  56y Male status-post .....  - Case and plan discussed with CTU Intensivist and CT Surgeon - Dr. Higgins/Bambi  - Continue CTU supportive care    - Continue DVT/GI prophylaxis  - Incentive Spirometry 10 times an hour  - Continue to advance physical activity as tolerated and continue PT/OT as directed  1. CAD: Continue ASA, statin, BB  2. HTN:   3. A. Fib:   4. COPD/Hypoxia:   5. DM/Glucose Control:     Social Service Disposition:     OPERATIVE PROCEDURE(s):      CABGx3 L-Radial            POD #          4            56yMale  SURGEON(s): JEFF Mantilla  SUBJECTIVE ASSESSMENT:      Vital Signs Last 24 Hrs  T(F): 98.9 (2021 00:00), Max: 99.3 (15 Feb 2021 16:00)  HR: 90 (2021 06:00) (78 - 107)  BP: 119/73 (2021 04:00) (110/68 - 129/80)  BP(mean): 91 (2021 04:00) (85 - 98)  RR: 22 (2021 06:00) (16 - 36)  SpO2: 97% (2021 06:00) (96% - 99%)      I&O's Detail    2021 07:01  -  15 Feb 2021 07:00  --------------------------------------------------------  IN:    Oral Fluid: 1560 mL  Total IN: 1560 mL    OUT:    Chest Tube (mL): 40 mL    Chest Tube (mL): 70 mL    Indwelling Catheter - Urethral (mL): 1150 mL    Voided (mL): 1350 mL  Total OUT: 2610 mL        Net:   I&O's Detail    2021 07:01  -  2021 07:00  --------------------------------------------------------  Total NET: -2488.5 mL      2021 07:01  -  15 Feb 2021 07:00  --------------------------------------------------------  Total NET: -1050 mL        CAPILLARY BLOOD GLUCOSE      POCT Blood Glucose.: 95 mg/dL (15 Feb 2021 06:58)    Physical Exam:  General: NAD; A&Ox3/Patient is intubated and sedated  Cardiac: S1/S2, RRR, no murmur, no rubs  Lungs: unlabored respirations, CTA b/l, no wheeze, no rales, no crackles  Abdomen: Soft/NT/ND; positive bowel sounds x 4  Sternum: Intact, no click, incision healing well with no drainage  Incisions: Incisions clean/dry/intact  Extremities: No edema b/l lower extremities; good capillary refill; no cyanosis; palpable 1+ pedal pulses b/l    Central Venous Catheter: Yes[]  No[] , If Yes indication:           Day #  Younger Catheter: Yes  [] , No  [] , If yes indication:                      Day #  NGT: Yes [] No [] ,    If Yes Placement:                                     Day #  EPICARDIAL WIRES:  [] YES [] NO                                              Day #  BOWEL MOVEMENT:  [] YES [] NO, If No, Timing since last BM:      Day #  CHEST TUBE(Left/Right):  [] YES [] NO, If yes -  AIR LEAKS:  [] YES [] NO        LABS:                        12.5<L>  8.48  )-----------( 173      ( 2021 01:14 )             36.5<L>                        12.1<L>  9.06  )-----------( 152      ( 15 Feb 2021 02:40 )             36.1<L>    0216    138  |  103  |  20  ----------------------------<  94  4.3   |  25  |  1.0  02-15    140  |  105  |  16  ----------------------------<  102<H>  4.1   |  24  |  0.9    Ca    8.4<L>      2021 01:14  Mg     2.1     02-16    TPro  6.5 [6.0 - 8.0]  /  Alb  4.0 [3.5 - 5.2]  /  TBili  0.8 [0.2 - 1.2]  /  DBili  x   /  AST  24 [0 - 41]  /  ALT  16 [0 - 41]  /  AlkPhos  38 [30 - 115]  02-16    PT/INR - ( 2021 07:29 )   PT: ;   INR: 1.37 ratio         PTT - ( 2021 07:29 )  PTT:31.6 sec      RADIOLOGY & ADDITIONAL TESTS:  CXR:   < from: Xray Chest 1 View- PORTABLE-Routine (02.15.21 @ 05:45) >  Impression:    Status post a median sternotomy.    Bibasilar opacities which may represent overlying soft tissues.    Right IJ line.      EK Lead ECG:   Ventricular Rate 93 BPM    Atrial Rate 93 BPM    P-R Interval 146 ms    QRS Duration 124 ms    Q-T Interval 384 ms    QTC Calculation(Bazett) 477 ms    P Axis 21 degrees    R Axis 35 degrees    T Axis -4 degrees    Diagnosis Line *** Poor data quality, interpretation may be adversely affected  Normal sinus rhythm  Right bundle branch block  T wave abnormality, consider inferior ischemia  Abnormal ECG    Confirmed by SHELBY TORIBIO MD ([id number]) on 2/15/2021 8:29:55 AM (02-15-21 @ 07:19)    MEDICATIONS  (STANDING):  albuterol/ipratropium for Nebulization 3 milliLiter(s) Nebulizer every 6 hours  amLODIPine   Tablet 2.5 milliGRAM(s) Oral daily  aspirin enteric coated 325 milliGRAM(s) Oral daily  chlorhexidine 4% Liquid 1 Application(s) Topical <User Schedule>  dextrose 5%. 1000 milliLiter(s) (50 mL/Hr) IV Continuous <Continuous>  dextrose 5%. 1000 milliLiter(s) (100 mL/Hr) IV Continuous <Continuous>  famotidine    Tablet 20 milliGRAM(s) Oral two times a day  glucagon  Injectable 1 milliGRAM(s) IntraMuscular once  heparin   Injectable 3000 Unit(s) SubCutaneous every 12 hours  meperidine     Injectable 25 milliGRAM(s) IV Push once  metoprolol tartrate 25 milliGRAM(s) Oral every 8 hours  polyethylene glycol 3350 17 Gram(s) Oral daily  sodium chloride 0.9% Bolus 300 milliLiter(s) IV Bolus once  sodium chloride 0.9%. 1000 milliLiter(s) (20 mL/Hr) IV Continuous <Continuous>  sodium chloride 0.9%. 1000 milliLiter(s) (75 mL/Hr) IV Continuous <Continuous>    MEDICATIONS  (PRN):  ALPRAZolam 0.5 milliGRAM(s) Oral at bedtime PRN insomnia  fentaNYL    Injectable 50 MICROGram(s) IV Push once PRN Moderate Pain (4 - 6)  ondansetron  IVPB 4 milliGRAM(s) IV Intermittent once PRN Nausea and/or Vomiting  oxyCODONE    IR 5 milliGRAM(s) Oral every 4 hours PRN Moderate Pain (4 - 6)  oxyCODONE    IR 10 milliGRAM(s) Oral every 4 hours PRN Severe Pain (7 - 10)    HEPARIN:  [] YES [] NO  Dose: XX UNITS/HR UNITS Q8H  LOVENOX:[] YES [] NO  Dose: XX mg Q24H  COUMADIN: []  YES [] NO  Dose: XX mg  Q24H  SCD's: YES b/l  GI Prophylaxis: Protonix [], Pepcid [], None [], (Contra-indication:.....)    Post-Op Aspirin: Yes [],  No [], If No, then contra-indication:  Post-Op Statin: Yes [], No[], If No, then contra-indication:  Post-Op Beta-Blockers: Yes [], No [], If No, then contra-indication:    Allergies:  No Known Allergies      Ambulation/Activity Status: Ambulates several times daily without assistance.    Assessment/Plan:  56y Male status-post .....  - Case and plan discussed with CTU Intensivist and CT Surgeon - Dr. Mantilla  - Continue CTU supportive care    - Continue DVT/GI prophylaxis  - Incentive Spirometry 10 times an hour  - Continue to advance physical activity as tolerated and continue PT/OT as directed  1. CAD: Continue ASA, statin, BB  2. HTN:   3. A. Fib:   4. COPD/Hypoxia:   5. DM/Glucose Control:     Social Service Disposition:     OPERATIVE PROCEDURE(s):      CABGx3 L-Radial            POD #          4            56yMale  SURGEON(s): JEFF Mantilla  SUBJECTIVE ASSESSMENT: Pt. seen and examined at bedside. Pt. reports no acute complaints.     Vital Signs Last 24 Hrs  T(F): 98.9 (2021 00:00), Max: 99.3 (15 Feb 2021 16:00)  HR: 90 (2021 06:00) (78 - 107)  BP: 119/73 (2021 04:00) (110/68 - 129/80)  BP(mean): 91 (2021 04:00) (85 - 98)  RR: 22 (2021 06:00) (16 - 36)  SpO2: 97% (2021 06:00) (96% - 99%)      I&O's Detail    2021 07:01  -  15 Feb 2021 07:00  --------------------------------------------------------  IN:    Oral Fluid: 1560 mL  Total IN: 1560 mL    OUT:    Chest Tube (mL): 40 mL    Chest Tube (mL): 70 mL    Indwelling Catheter - Urethral (mL): 1150 mL    Voided (mL): 1350 mL  Total OUT: 2610 mL        Net:   I&O's Detail    2021 07:01  -  2021 07:00  --------------------------------------------------------  Total NET: -2488.5 mL      2021 07:01  -  15 Feb 2021 07:00  --------------------------------------------------------  Total NET: -1050 mL      CAPILLARY BLOOD GLUCOSE      POCT Blood Glucose.: 95 mg/dL (15 Feb 2021 06:58)    Physical Exam:  General: NAD; A&Ox3  Cardiac: S1/S2, RRR, no murmur, no rubs  Lungs: unlabored respirations, CTA b/l, no wheeze, no rales, no crackles  Abdomen: Soft/NT/ND; positive bowel sounds x 4  Sternum: Intact, no click, incision healing well with no drainage  Incisions: Incisions clean/dry/intact  Extremities: Trace edema b/l lower extremities; good capillary refill; no cyanosis; palpable 1+ pedal pulses b/l    Central Venous Catheter: Yes[],No[x],If Yes indication:           Day #  4  Younger Catheter: Yes  [] , No  [x], If yes indication:                      Day #   4  NGT: Yes [] No [x],    If Yes Placement:                                     Day #  4  EPICARDIAL WIRES:  [] YES [x]NO                                              Day #  4  BOWEL MOVEMENT:  [x]YES [] NO, If No, Timing since last BM:      Day #  CHEST TUBE(Left/Right):  [] YES [x]NO, If yes -  AIR LEAKS:  [] YES [] NO        LABS:                        12.5<L>  8.48  )-----------( 173      ( 2021 01:14 )             36.5<L>                        12.1<L>  9.06  )-----------( 152      ( 15 Feb 2021 02:40 )             36.1<L>    02-16    138  |  103  |  20  ----------------------------<  94  4.3   |  25  |  1.0  02-15    140  |  105  |  16  ----------------------------<  102<H>  4.1   |  24  |  0.9    Ca    8.4<L>      2021 01:14  Mg     2.1     02-16    TPro  6.5 [6.0 - 8.0]  /  Alb  4.0 [3.5 - 5.2]  /  TBili  0.8 [0.2 - 1.2]  /  DBili  x   /  AST  24 [0 - 41]  /  ALT  16 [0 - 41]  /  AlkPhos  38 [30 - 115]  02-16    PT/INR - ( 2021 07:29 )   PT: ;   INR: 1.37 ratio         PTT - ( 2021 07:29 )  PTT:31.6 sec      RADIOLOGY & ADDITIONAL TESTS:  CXR:   < from: Xray Chest 2 Views PA/Lat (21 @ 08:14) >    IMPRESSION:    Residual small bilateral pleural effusions.    < from: Xray Chest 1 View- PORTABLE-Routine (02.15.21 @ 05:45) >  Impression:    Status post a median sternotomy.    Bibasilar opacities which may represent overlying soft tissues.    Right IJ line.      EK Lead ECG:   Ventricular Rate 93 BPM    Atrial Rate 93 BPM    P-R Interval 146 ms    QRS Duration 124 ms    Q-T Interval 384 ms    QTC Calculation(Bazett) 477 ms    P Axis 21 degrees    R Axis 35 degrees    T Axis -4 degrees    Diagnosis Line *** Poor data quality, interpretation may be adversely affected  Normal sinus rhythm  Right bundle branch block  T wave abnormality, consider inferior ischemia  Abnormal ECG    Confirmed by SHELBY TORIBIO MD ([id number]) on 2/15/2021 8:29:55 AM (02-15-21 @ 07:19)    MEDICATIONS  (STANDING):  albuterol/ipratropium for Nebulization 3 milliLiter(s) Nebulizer every 6 hours  amLODIPine   Tablet 2.5 milliGRAM(s) Oral daily  aspirin enteric coated 325 milliGRAM(s) Oral daily  chlorhexidine 4% Liquid 1 Application(s) Topical <User Schedule>  dextrose 5%. 1000 milliLiter(s) (50 mL/Hr) IV Continuous <Continuous>  dextrose 5%. 1000 milliLiter(s) (100 mL/Hr) IV Continuous <Continuous>  famotidine    Tablet 20 milliGRAM(s) Oral two times a day  glucagon  Injectable 1 milliGRAM(s) IntraMuscular once  heparin   Injectable 3000 Unit(s) SubCutaneous every 12 hours  meperidine     Injectable 25 milliGRAM(s) IV Push once  metoprolol tartrate 25 milliGRAM(s) Oral every 8 hours  polyethylene glycol 3350 17 Gram(s) Oral daily  sodium chloride 0.9% Bolus 300 milliLiter(s) IV Bolus once  sodium chloride 0.9%. 1000 milliLiter(s) (20 mL/Hr) IV Continuous <Continuous>  sodium chloride 0.9%. 1000 milliLiter(s) (75 mL/Hr) IV Continuous <Continuous>    MEDICATIONS  (PRN):  ALPRAZolam 0.5 milliGRAM(s) Oral at bedtime PRN insomnia  fentaNYL    Injectable 50 MICROGram(s) IV Push once PRN Moderate Pain (4 - 6)  ondansetron  IVPB 4 milliGRAM(s) IV Intermittent once PRN Nausea and/or Vomiting  oxyCODONE    IR 5 milliGRAM(s) Oral every 4 hours PRN Moderate Pain (4 - 6)  oxyCODONE    IR 10 milliGRAM(s) Oral every 4 hours PRN Severe Pain (7 - 10)    HEPARIN:  [x]YES [] NO  Dose: 3,000 /HR UNITS Q12H  LOVENOX:[] YES [x]NO  Dose: XX mg Q24H  COUMADIN: []  YES [x]NO  Dose: XX mg  Q24H  SCD's: YES b/l  GI Prophylaxis: Protonix [], Pepcid [x],None [], (Contra-indication:.....)    Post-Op Aspirin: Yes [x], No [],If No, then contra-indication:  Post-Op Statin: Yes [], No[x], If No, then contra-indication: elevated LFTs  Post-Op Beta-Blockers: Yes [x], No [], If No, then contra-indication:  Post-Op Calcium Channel Blockers:  Yes [x], No [], If No, then contra-indication:    Allergies:  No Known Allergies    Ambulation/Activity Status: Ambulates several times daily without assistance.    Assessment/Plan:  56y Male status-post     CABGx3 L-Radial            POD #          4   - Case and plan discussed with CTU Intensivist and CT Surgeon - Dr. Mantilla  - Continue CTU supportive care    - Continue DVT/GI prophylaxis  - Incentive Spirometry 10 times an hour  - Continue to advance physical activity as tolerated and continue T/OT as directed  - Discharged epicardial wires   1. CAD: Continue ASA, statin, BB, norvasc to prevent radial vasospasm, pain control  2. HTN: Cont bb, norvasc   3. DM/Glucose Control: Continue Insulin sliding scale  4. Pst-op hypoxia: cont nebs, mucinex, wean o2, encourage incentive spirometry    dispo: home   OPERATIVE PROCEDURE(s):      CABGx3 L-Radial            POD #          4            56yMale  SURGEON(s): JEFF Mantilla  SUBJECTIVE ASSESSMENT: Pt. seen and examined at bedside. Pt. reports no acute complaints.     Vital Signs Last 24 Hrs  T(F): 98.9 (2021 00:00), Max: 99.3 (15 Feb 2021 16:00)  HR: 90 (2021 06:00) (78 - 107)  BP: 119/73 (2021 04:00) (110/68 - 129/80)  BP(mean): 91 (2021 04:00) (85 - 98)  RR: 22 (2021 06:00) (16 - 36)  SpO2: 97% (2021 06:00) (96% - 99%)      I&O's Detail    2021 07:01  -  15 Feb 2021 07:00  --------------------------------------------------------  IN:    Oral Fluid: 1560 mL  Total IN: 1560 mL    OUT:    Chest Tube (mL): 40 mL    Chest Tube (mL): 70 mL    Indwelling Catheter - Urethral (mL): 1150 mL    Voided (mL): 1350 mL  Total OUT: 2610 mL        Net:   I&O's Detail    2021 07:01  -  2021 07:00  --------------------------------------------------------  Total NET: -2488.5 mL      2021 07:01  -  15 Feb 2021 07:00  --------------------------------------------------------  Total NET: -1050 mL      CAPILLARY BLOOD GLUCOSE      POCT Blood Glucose.: 95 mg/dL (15 Feb 2021 06:58)    Physical Exam:  General: NAD; A&Ox3  Cardiac: S1/S2, RRR, no murmur, no rubs  Lungs: unlabored respirations, CTA b/l, no wheeze, no rales, no crackles  Abdomen: Soft/NT/ND; positive bowel sounds x 4  Sternum: Intact, no click, incision healing well with no drainage  Incisions: Incisions clean/dry/intact  Extremities: Trace edema b/l lower extremities; good capillary refill; no cyanosis; palpable 1+ pedal pulses b/l    Central Venous Catheter: Yes[],No[x],If Yes indication:           Day #    Younger Catheter: Yes  [] , No  [x], If yes indication:                      Day #     NGT: Yes [] No [x],    If Yes Placement:                                     Day #    EPICARDIAL WIRES:  [x] YES []NO                                              Day #  4- d/c today  BOWEL MOVEMENT:  [x]YES [] NO, If No, Timing since last BM:      Day #  CHEST TUBE(Left/Right):  [] YES [x]NO, If yes -  AIR LEAKS:  [] YES [] NO        LABS:                        12.5<L>  8.48  )-----------( 173      ( 2021 01:14 )             36.5<L>                        12.1<L>  9.06  )-----------( 152      ( 15 Feb 2021 02:40 )             36.1<L>    02-16    138  |  103  |  20  ----------------------------<  94  4.3   |  25  |  1.0  02-15    140  |  105  |  16  ----------------------------<  102<H>  4.1   |  24  |  0.9    Ca    8.4<L>      2021 01:14  Mg     2.1     02-16    TPro  6.5 [6.0 - 8.0]  /  Alb  4.0 [3.5 - 5.2]  /  TBili  0.8 [0.2 - 1.2]  /  DBili  x   /  AST  24 [0 - 41]  /  ALT  16 [0 - 41]  /  AlkPhos  38 [30 - 115]  02-16    PT/INR - ( 2021 07:29 )   PT: ;   INR: 1.37 ratio         PTT - ( 2021 07:29 )  PTT:31.6 sec      RADIOLOGY & ADDITIONAL TESTS:  CXR:   < from: Xray Chest 2 Views PA/Lat (21 @ 08:14) >    IMPRESSION:    Residual small bilateral pleural effusions.    < from: Xray Chest 1 View- PORTABLE-Routine (02.15.21 @ 05:45) >  Impression:    Status post a median sternotomy.    Bibasilar opacities which may represent overlying soft tissues.    Right IJ line.      EK Lead ECG:   Ventricular Rate 93 BPM    Atrial Rate 93 BPM    P-R Interval 146 ms    QRS Duration 124 ms    Q-T Interval 384 ms    QTC Calculation(Bazett) 477 ms    P Axis 21 degrees    R Axis 35 degrees    T Axis -4 degrees    Diagnosis Line *** Poor data quality, interpretation may be adversely affected  Normal sinus rhythm  Right bundle branch block  T wave abnormality, consider inferior ischemia  Abnormal ECG    Confirmed by SHELBY TORIBIO MD ([id number]) on 2/15/2021 8:29:55 AM (02-15-21 @ 07:19)    MEDICATIONS  (STANDING):  albuterol/ipratropium for Nebulization 3 milliLiter(s) Nebulizer every 6 hours  amLODIPine   Tablet 2.5 milliGRAM(s) Oral daily  aspirin enteric coated 325 milliGRAM(s) Oral daily  chlorhexidine 4% Liquid 1 Application(s) Topical <User Schedule>  dextrose 5%. 1000 milliLiter(s) (50 mL/Hr) IV Continuous <Continuous>  dextrose 5%. 1000 milliLiter(s) (100 mL/Hr) IV Continuous <Continuous>  famotidine    Tablet 20 milliGRAM(s) Oral two times a day  glucagon  Injectable 1 milliGRAM(s) IntraMuscular once  heparin   Injectable 3000 Unit(s) SubCutaneous every 12 hours  meperidine     Injectable 25 milliGRAM(s) IV Push once  metoprolol tartrate 25 milliGRAM(s) Oral every 8 hours  polyethylene glycol 3350 17 Gram(s) Oral daily  sodium chloride 0.9% Bolus 300 milliLiter(s) IV Bolus once  sodium chloride 0.9%. 1000 milliLiter(s) (20 mL/Hr) IV Continuous <Continuous>  sodium chloride 0.9%. 1000 milliLiter(s) (75 mL/Hr) IV Continuous <Continuous>    MEDICATIONS  (PRN):  ALPRAZolam 0.5 milliGRAM(s) Oral at bedtime PRN insomnia  fentaNYL    Injectable 50 MICROGram(s) IV Push once PRN Moderate Pain (4 - 6)  ondansetron  IVPB 4 milliGRAM(s) IV Intermittent once PRN Nausea and/or Vomiting  oxyCODONE    IR 5 milliGRAM(s) Oral every 4 hours PRN Moderate Pain (4 - 6)  oxyCODONE    IR 10 milliGRAM(s) Oral every 4 hours PRN Severe Pain (7 - 10)    HEPARIN:  [x]YES [] NO  Dose: 3,000 /HR UNITS Q12H  LOVENOX:[] YES [x]NO  Dose: XX mg Q24H  COUMADIN: []  YES [x]NO  Dose: XX mg  Q24H  SCD's: YES b/l  GI Prophylaxis: Protonix [], Pepcid [x],None [], (Contra-indication:.....)    Post-Op Aspirin: Yes [x], No [],If No, then contra-indication:  Post-Op Statin: Yes [x], No[], If No, then contra-indication:   Post-Op Beta-Blockers: Yes [x], No [], If No, then contra-indication:  Post-Op Calcium Channel Blockers:  Yes [x], No [], If No, then contra-indication:    Allergies:  No Known Allergies    Ambulation/Activity Status: Ambulates several times daily without assistance.    Assessment/Plan:  56y Male status-post     CABGx3 L-Radial            POD #          4   - Case and plan discussed with CTU Intensivist and CT Surgeon - Dr. Mantilla  - Continue CTU supportive care    - Continue DVT/GI prophylaxis  - Incentive Spirometry 10 times an hour  - Continue to advance physical activity as tolerated and continue T/OT as directed  - Discharged epicardial wires   1. CAD: Continue ASA, start statin, BB, norvasc to prevent radial vasospasm, pain control  2. HTN: Cont bb, norvasc   3. DM/Glucose Control: Continue Insulin sliding scale      dispo: home

## 2021-02-16 NOTE — DISCHARGE NOTE NURSING/CASE MANAGEMENT/SOCIAL WORK - PATIENT PORTAL LINK FT
You can access the FollowMyHealth Patient Portal offered by [facility] by registering at the following website: [email or web address]. By joining [employer]’s FollowMyHealth portal, you will also be able to view your health information using other applications (apps) compatible with our system.

## 2021-02-16 NOTE — PROGRESS NOTE ADULT - SUBJECTIVE AND OBJECTIVE BOX
CTU Attending Progress Daily Note     16 Feb 2021 10:20    Procedure:               CABG                                   POD#       4            Patient seen as post-op critical care follow-up    HPI:  This is a 56 year old male with PMHx of HTN, DLD, and ?Prediabetes who presented to the ED with chest pain. As per the patient reports that he had an exercise stress test done recently that was positive for symptoms and ekg changes following moderate workload. Imaging noted ischemia in the RCA area as well as hypokinesis and decreased thickening of the inferior wall. He notes that for a few months since his father passed away he has been having some stable angina. Reported exertional chest tightness alleviated with rest. He was being worked up by Dr. Weinstein with elective cath schedueld for 2/16/2021. However he noted on the day of presentation that he was lifting something heavy and he had further episode of chest tightness/pain. He had been advised to go to the ED if he had further episodes of chest pain so that is what he did.     In the ED initial vitals: /90, HR 78, T 96.8, Sat 98% on room air. Seen by cardiology who advised add on for cardiac cath. Cardiac cath performed and noted triple vessel disease. Patient to be admitted to the CCU for CT surgery workup and inpatient surgery.  (11 Feb 2021 21:35)    See preop testing chart H&P    Interval event for past 24 hr:  JACKY GUZMAN  56y had no event.     Current Complains:  JACKY GUZMAN has no new complaints    REVIEW OF SYSTEMS:  CONSTITUTIONAL:  [-] weakness, [-] fevers, [-] chills  EYES/ENT: [-] visual changes, [-] vertigo, [-] throat pain   NECK: [-] pain, [-] stiffness  RESPIRATORY: [-] cough, [-] wheezing, [-] hemoptysis, [-] shortness of breath  CARDIOVASCULAR: [-] chest pain, [-] palpitations, [-] orthopnea  GASTROINTESTINAL:    [-]abdominal pain, [-] nausea, [-] vomiting, [-] hematemesis, [-] diarrhea, [-] constipation, [-] melena, [-] hematochezia.  GENITOURINARY: [-] dysuria, [-] frequency, [-] hematuria  NEUROLOGICAL: [-] numbness, [-] weakness  SKIN: [-] itching, [-] burning, [-] rashes, [-] lesions   All other review of systems is negative unless indicated above.    [  ] Unable to assess ROS because :    OBJECTIVE:  ICU Vital Signs Last 24 Hrs  T(C): 37 (16 Feb 2021 08:13), Max: 37.4 (15 Feb 2021 16:00)  T(F): 98.6 (16 Feb 2021 08:13), Max: 99.3 (15 Feb 2021 16:00)  HR: 104 (16 Feb 2021 08:13) (78 - 104)  BP: 121/63 (16 Feb 2021 08:13) (110/68 - 136/83)  BP(mean): 86 (16 Feb 2021 08:13) (85 - 105)  ABP: --  ABP(mean): --  RR: 12 (16 Feb 2021 08:13) (12 - 36)  SpO2: 93% (16 Feb 2021 08:13) (93% - 98%)      I&O's Summary    15 Feb 2021 07:01  -  16 Feb 2021 07:00  --------------------------------------------------------  IN: 630 mL / OUT: 1700 mL / NET: -1070 mL      PHYSICAL EXAM:  General: WN/WD NAD    HEENT:     [+] NCAT  [+] EOMI  [-] Conjuctival edema   [-] Icterus   [-] Thrush   [-] ETT  [-] NGT/OGT    Neck:         [+] FROM   [-] JVD     [-] Nodes     [-] Masses    [+] Mid-line trachea    [-] Tracheostomy    Chest:         [-] Sternal click   [-] Sternal drainage   [-] Pacing wires   [-] Chest tubes   [-] SubQ emphysema    Lungs:          [+] CTA   [-] Rhonchi   [-] Rales    [-] Wheezing    [-] Decreased BS    [-] Dullness R L    Cardiac:       [+] S1 [+] S2    [+] RRR   [-] Irregular   [-] S3   [-] S4    [-] Murmurs    [-] Rub    Abdomen:    [+] BS    [+] Soft    [+] Non-tender     [-] Distended    [-] Organomegaly  [-] PEG    Extremities:   [-] Cyanosis U/L   [-] Clubbing  U/L  [-] LE/UE Edema   [+] Capillary refill    [+] Pulses     Neuro:        [+] Awake   [+]  Alert   [-] Confused   [-] Lethargic   [-] Sedated   [-] Generalized Weakness    Skin:        [-] Rashes    [-] Erythema   [+] Normal incisions   [+] IV sites intact   [-] Sacral decubitus    Tubes:  LINES:    CAPILLARY BLOOD GLUCOSE      POCT Blood Glucose.: 95 mg/dL (15 Feb 2021 06:58)    CAPILLARY BLOOD GLUCOSE          HOSPITAL MEDICATIONS:  MEDICATIONS  (STANDING):  albuterol/ipratropium for Nebulization 3 milliLiter(s) Nebulizer every 6 hours  amLODIPine   Tablet 2.5 milliGRAM(s) Oral daily  aspirin enteric coated 325 milliGRAM(s) Oral daily  chlorhexidine 4% Liquid 1 Application(s) Topical <User Schedule>  dextrose 5%. 1000 milliLiter(s) (50 mL/Hr) IV Continuous <Continuous>  dextrose 5%. 1000 milliLiter(s) (100 mL/Hr) IV Continuous <Continuous>  famotidine    Tablet 20 milliGRAM(s) Oral two times a day  glucagon  Injectable 1 milliGRAM(s) IntraMuscular once  heparin   Injectable 3000 Unit(s) SubCutaneous every 12 hours  meperidine     Injectable 25 milliGRAM(s) IV Push once  metoprolol tartrate 25 milliGRAM(s) Oral every 8 hours  polyethylene glycol 3350 17 Gram(s) Oral daily  sodium chloride 0.9% Bolus 300 milliLiter(s) IV Bolus once  sodium chloride 0.9%. 1000 milliLiter(s) (20 mL/Hr) IV Continuous <Continuous>  sodium chloride 0.9%. 1000 milliLiter(s) (75 mL/Hr) IV Continuous <Continuous>    MEDICATIONS  (PRN):  ALPRAZolam 0.5 milliGRAM(s) Oral at bedtime PRN insomnia  fentaNYL    Injectable 50 MICROGram(s) IV Push once PRN Moderate Pain (4 - 6)  ondansetron  IVPB 4 milliGRAM(s) IV Intermittent once PRN Nausea and/or Vomiting  oxyCODONE    IR 5 milliGRAM(s) Oral every 4 hours PRN Moderate Pain (4 - 6)  oxyCODONE    IR 10 milliGRAM(s) Oral every 4 hours PRN Severe Pain (7 - 10)      LABS:                          12.5   8.48  )-----------( 173      ( 16 Feb 2021 01:14 )             36.5     02-16    138  |  103  |  20  ----------------------------<  94  4.3   |  25  |  1.0    Ca    8.4<L>      16 Feb 2021 01:14  Mg     2.1     02-16    TPro  6.5  /  Alb  4.0  /  TBili  0.8  /  DBili  x   /  AST  24  /  ALT  16  /  AlkPhos  38  02-16            RADIOLOGY:  Reviewed and interpreted by me  CXR from 02-16-21 shows [+] mild congestion, [-] pneumothorax, [-] R/L effusion, [-] cardiomegaly,       ECG:  Reviewed and interpreted by me:   QTC:    Assessment:  CAD SP CABG    PAST MEDICAL & SURGICAL HISTORY:  Dyslipidemia    Hypertension    No significant past surgical history        PLAN:  Neuro: Pain control  Pulm: Encourage coughing, deep breathing and use of incentive spirometry. Wean off supplemental oxygen as able. Daily CXR.   Cardio: Monitor telemetry/alarms. Continue cardiac meds  GI: Tolerating diet. Continue stool softeners. Continue GI prophylaxis  Renal: monitor urine output, supplement electrolytes as needed  Vasc: Heparin SC/SCDs for DVT prophylaxis  Heme: Monitor H/H.   ID: Off antibiotics. Stable.  Endocrine: Monitor finger stick blood sugar and control hyperglycemia with insulin  Physical Therapy: OOB/ambulate      Discussed with Cardiothoracic Team at AM rounds.

## 2021-02-16 NOTE — PROGRESS NOTE ADULT - PROVIDER SPECIALTY LIST ADULT
CCU
CT Surgery
CT Surgery
Critical Care
Critical Care
CT Surgery
CT Surgery
Critical Care
Critical Care

## 2021-02-16 NOTE — CHART NOTE - NSCHARTNOTEFT_GEN_A_CORE
Temporary epicardial pacing wires removed without resistance  Bed rest enforced for 1 hour with VS q 15 mins to monitor   RN made aware

## 2021-02-17 ENCOUNTER — NON-APPOINTMENT (OUTPATIENT)
Age: 56
End: 2021-02-17

## 2021-02-17 RX ORDER — AMLODIPINE BESYLATE 5 MG/1
5 TABLET ORAL
Qty: 90 | Refills: 1 | Status: DISCONTINUED | COMMUNITY
Start: 2021-01-26 | End: 2021-02-17

## 2021-02-17 RX ORDER — ROSUVASTATIN CALCIUM 20 MG/1
20 TABLET, FILM COATED ORAL
Qty: 90 | Refills: 1 | Status: DISCONTINUED | COMMUNITY
Start: 2021-01-19 | End: 2021-02-17

## 2021-02-17 RX ORDER — NITROGLYCERIN 0.4 MG/1
0.4 TABLET SUBLINGUAL
Qty: 100 | Refills: 1 | Status: DISCONTINUED | COMMUNITY
Start: 2021-02-09 | End: 2021-02-17

## 2021-02-17 RX ORDER — ALPRAZOLAM 0.5 MG/1
0.5 TABLET ORAL
Qty: 60 | Refills: 0 | Status: DISCONTINUED | COMMUNITY
Start: 2020-12-04 | End: 2021-02-17

## 2021-02-18 ENCOUNTER — APPOINTMENT (OUTPATIENT)
Dept: CARE COORDINATION | Facility: HOME HEALTH | Age: 56
End: 2021-02-18
Payer: COMMERCIAL

## 2021-02-18 VITALS — OXYGEN SATURATION: 95 % | DIASTOLIC BLOOD PRESSURE: 76 MMHG | HEART RATE: 98 BPM | SYSTOLIC BLOOD PRESSURE: 124 MMHG

## 2021-02-18 VITALS — RESPIRATION RATE: 12 BRPM

## 2021-02-18 PROCEDURE — 99024 POSTOP FOLLOW-UP VISIT: CPT

## 2021-02-18 NOTE — PHYSICAL EXAM
[Normal Rhythm/Effort] : normal respiratory rhythm and effort [Clear Bilaterally] : the lungs were clear to auscultation bilaterally [Normal Breath Sounds] : breath sounds were normal [2+] : left 2+ [Abdomen Soft] : soft [Oriented To Time, Place, And Person] : oriented to person, place, and time [FreeTextEntry1] : SAURABH HANSON C/D/I

## 2021-02-18 NOTE — HISTORY OF PRESENT ILLNESS
[FreeTextEntry1] : FOLLOW YOUR HEART\par \par TCM COVID-19 screening protocol reviewed with patient and/or caregiver and denies any signs and symptoms.  Patient and/or care giver denies any contact with a suspect or known COVID-19 case within the last 14 days.  Patient and/or caregiver have tested negative for COVID-19. Patient and/or caregiver does not live in an assisted living or skilled nursing facility.  Patient and/or care giver [place] last 14 days. \par \par Hospital Course	 \par This is a 56 year old male non-smoker with PMHx of HTN, DLD, and Prediabetes \par who presented to the ED with chest pain on exertion. He had an exercise stress \par test done recently that was positive for symptoms and ekg changes following \par moderate workload. Imaging noted ischemia in the RCA area as well as \par hypokinesis and decreased thickening of the inferior wall. He notes that for a \par few months since his father passed away he has been having some worsening \par angina. Reported exertional chest tightness alleviated with rest. He was being \par worked up by Dr. Weinstein with elective cath scheduled for 2/16/2021. However \par he was lifting something heavy and he had further episode of chest \par tightness/pain. He was advised to go to the ED if he had further episodes of \par chest pain so that is what he did. Admitted with acute coronary syndrome. \par \par he appears comfortable - accompanied by his wife\par They offer minimal questions

## 2021-02-18 NOTE — ASSESSMENT
[FreeTextEntry1] : Overall, he appears ot be progressing well\par Compliant with all meds/post-op cares as per DC instructions\par Ambulating frequently\par Using IS\par VN services in place\par F/u appointments reviewed\par \par Education\par 1.  DC instructions reviewed with patient - discussed importance of medications (indication, schedule, side effects, and importance of compliance reviewed) and f/u appointments.\par 2.  Clean incision sites daily - shower indirectly, clean with soap and water, pat dry.  Avoid the use of lotions, ointments, powders, and perfumes on or around incision sites.\par 3.  Sternal precautions - avoid any heavy lifting (>5-10 lbs x 8 weeks), raising both arms above head simultaneously.\par 4.  Place TEDs on in AM prior to getting out of bed and off in PM when returning to bed.\par 5.  Follow a heart healthy diet - low salt, low fat.\par 6.  Exercise daily.\par 7.  Monitor and call [facility] NP for signs and symptoms of infection (redness, drainage, and fever).\par 8.  Monitor daily weights (call for a gain of 2-3 lbs/day or 5-6 lbs/week). \par 9.  Blood sugar control necessary for wound healing if applicable.\par 10.  Avoid straining during BM - use stool softners as needed. \par 11.  Instructed on importance of incentive spirometry use (10x/hour).\par \par Patient verbalized understanding of all education outlined above. Pt instructed to call [facility] NP for any issues as delineated above.\par \par PLAN\par 1.  Monitor daily weights\par 2.  Continue current medications as prescribed\par 3.  Incentive spirometry use\par 4.  Maintain sternal precautions\par 5.  Exercise daily\par 5.  Maintain HH diet\par 6.  Maintain TEDs\par 7.  Keep legs elevated\par 8.  F/U appointments - \par CTSx Jose Rafael 2/22\par Cardio Corinna PURVIS\par 9.  [facility] NP will continue to f/u with patient status - Pt agrees to call with any questions/concerns\par 10. To recover without complications.\par

## 2021-02-18 NOTE — ASSESSMENT
[FreeTextEntry1] : Overall, he appears ot be progressing well\par Compliant with all meds/post-op cares as per DC instructions\par Ambulating frequently\par Using IS\par VN services in place\par F/u appointments reviewed\par \par Education\par 1.  DC instructions reviewed with patient - discussed importance of medications (indication, schedule, side effects, and importance of compliance reviewed) and f/u appointments.\par 2.  Clean incision sites daily - shower indirectly, clean with soap and water, pat dry.  Avoid the use of lotions, ointments, powders, and perfumes on or around incision sites.\par 3.  Sternal precautions - avoid any heavy lifting (>5-10 lbs x 8 weeks), raising both arms above head simultaneously.\par 4.  Place TEDs on in AM prior to getting out of bed and off in PM when returning to bed.\par 5.  Follow a heart healthy diet - low salt, low fat.\par 6.  Exercise daily.\par 7.  Monitor and call [facility] NP for signs and symptoms of infection (redness, drainage, and fever).\par 8.  Monitor daily weights (call for a gain of 2-3 lbs/day or 5-6 lbs/week). \par 9.  Blood sugar control necessary for wound healing if applicable.\par 10.  Avoid straining during BM - use stool softners as needed. \par 11.  Instructed on importance of incentive spirometry use (10x/hour).\par \par Patient verbalized understanding of all education outlined above. Pt instructed to call [facility] NP for any issues as delineated above.\par \par PLAN\par 1.  Monitor daily weights\par 2.  Continue current medications as prescribed\par 3.  Incentive spirometry use\par 4.  Maintain sternal precautions\par 5.  Exercise daily\par 5.  Maintain HH diet\par 6.  Maintain TEDs\par 7.  Keep legs elevated\par 8.  F/U appointments - \par CTSx Jose Rafael 2/22\par Cardio Corinna PURVIS\par 9.  [facility] NP will continue to f/u with patient status - Pt agrees to call with any questions/concerns\par 10. To recover without complications.\par

## 2021-02-20 ENCOUNTER — RX RENEWAL (OUTPATIENT)
Age: 56
End: 2021-02-20

## 2021-02-22 ENCOUNTER — APPOINTMENT (OUTPATIENT)
Dept: CARDIOTHORACIC SURGERY | Facility: CLINIC | Age: 56
End: 2021-02-22
Payer: COMMERCIAL

## 2021-02-22 VITALS
HEART RATE: 98 BPM | OXYGEN SATURATION: 96 % | SYSTOLIC BLOOD PRESSURE: 131 MMHG | RESPIRATION RATE: 17 BRPM | DIASTOLIC BLOOD PRESSURE: 67 MMHG | BODY MASS INDEX: 36.83 KG/M2 | HEIGHT: 68 IN | WEIGHT: 243 LBS | TEMPERATURE: 98.4 F

## 2021-02-22 PROCEDURE — 99024 POSTOP FOLLOW-UP VISIT: CPT

## 2021-02-24 DIAGNOSIS — E78.5 HYPERLIPIDEMIA, UNSPECIFIED: ICD-10-CM

## 2021-02-24 DIAGNOSIS — R74.01 ELEVATION OF LEVELS OF LIVER TRANSAMINASE LEVELS: ICD-10-CM

## 2021-02-24 DIAGNOSIS — I24.9 ACUTE ISCHEMIC HEART DISEASE, UNSPECIFIED: ICD-10-CM

## 2021-02-24 DIAGNOSIS — E87.70 FLUID OVERLOAD, UNSPECIFIED: ICD-10-CM

## 2021-02-24 DIAGNOSIS — E87.6 HYPOKALEMIA: ICD-10-CM

## 2021-02-24 DIAGNOSIS — I25.84 CORONARY ATHEROSCLEROSIS DUE TO CALCIFIED CORONARY LESION: ICD-10-CM

## 2021-02-24 DIAGNOSIS — E11.65 TYPE 2 DIABETES MELLITUS WITH HYPERGLYCEMIA: ICD-10-CM

## 2021-02-24 DIAGNOSIS — I10 ESSENTIAL (PRIMARY) HYPERTENSION: ICD-10-CM

## 2021-02-24 DIAGNOSIS — D64.9 ANEMIA, UNSPECIFIED: ICD-10-CM

## 2021-02-24 DIAGNOSIS — R09.02 HYPOXEMIA: ICD-10-CM

## 2021-02-24 DIAGNOSIS — I45.10 UNSPECIFIED RIGHT BUNDLE-BRANCH BLOCK: ICD-10-CM

## 2021-02-24 DIAGNOSIS — R07.9 CHEST PAIN, UNSPECIFIED: ICD-10-CM

## 2021-02-24 DIAGNOSIS — I25.110 ATHEROSCLEROTIC HEART DISEASE OF NATIVE CORONARY ARTERY WITH UNSTABLE ANGINA PECTORIS: ICD-10-CM

## 2021-03-09 ENCOUNTER — APPOINTMENT (OUTPATIENT)
Dept: CARDIOLOGY | Facility: CLINIC | Age: 56
End: 2021-03-09
Payer: COMMERCIAL

## 2021-03-09 ENCOUNTER — APPOINTMENT (OUTPATIENT)
Dept: CARDIOTHORACIC SURGERY | Facility: CLINIC | Age: 56
End: 2021-03-09

## 2021-03-09 VITALS
HEIGHT: 68 IN | OXYGEN SATURATION: 97 % | HEART RATE: 82 BPM | TEMPERATURE: 98 F | DIASTOLIC BLOOD PRESSURE: 63 MMHG | SYSTOLIC BLOOD PRESSURE: 121 MMHG | RESPIRATION RATE: 14 BRPM

## 2021-03-09 VITALS
SYSTOLIC BLOOD PRESSURE: 122 MMHG | BODY MASS INDEX: 34.86 KG/M2 | HEIGHT: 68 IN | DIASTOLIC BLOOD PRESSURE: 70 MMHG | WEIGHT: 230 LBS

## 2021-03-09 DIAGNOSIS — I25.119 ATHEROSCLEROTIC HEART DISEASE OF NATIVE CORONARY ARTERY WITH UNSPECIFIED ANGINA PECTORIS: ICD-10-CM

## 2021-03-09 DIAGNOSIS — Z87.898 PERSONAL HISTORY OF OTHER SPECIFIED CONDITIONS: ICD-10-CM

## 2021-03-09 DIAGNOSIS — Z86.79 PERSONAL HISTORY OF OTHER DISEASES OF THE CIRCULATORY SYSTEM: ICD-10-CM

## 2021-03-09 PROCEDURE — 99072 ADDL SUPL MATRL&STAF TM PHE: CPT

## 2021-03-09 PROCEDURE — 93000 ELECTROCARDIOGRAM COMPLETE: CPT

## 2021-03-09 PROCEDURE — 99214 OFFICE O/P EST MOD 30 MIN: CPT

## 2021-03-09 RX ORDER — SENNOSIDES 8.6 MG
TABLET ORAL
Refills: 0 | Status: DISCONTINUED | COMMUNITY
End: 2021-03-09

## 2021-03-09 RX ORDER — GARLIC 200 MG
TABLET ORAL
Refills: 0 | Status: DISCONTINUED | COMMUNITY
End: 2021-03-09

## 2021-03-09 RX ORDER — ISOSORBIDE MONONITRATE 30 MG/1
30 TABLET, EXTENDED RELEASE ORAL
Qty: 90 | Refills: 1 | Status: DISCONTINUED | COMMUNITY
Start: 2021-02-09 | End: 2021-03-09

## 2021-03-09 RX ORDER — VITAMIN B COMPLEX
TABLET ORAL DAILY
Refills: 0 | Status: DISCONTINUED | COMMUNITY
End: 2021-03-09

## 2021-03-09 RX ORDER — ASPIRIN 325 MG/1
325 TABLET ORAL
Qty: 30 | Refills: 0 | Status: DISCONTINUED | COMMUNITY
Start: 2021-02-16

## 2021-03-09 RX ORDER — OXYCODONE AND ACETAMINOPHEN 5; 325 MG/1; MG/1
5-325 TABLET ORAL
Refills: 0 | Status: DISCONTINUED | COMMUNITY
Start: 2021-02-17 | End: 2021-03-09

## 2021-03-09 RX ORDER — NEBIVOLOL HYDROCHLORIDE 20 MG/1
20 TABLET ORAL
Qty: 10 | Refills: 0 | Status: DISCONTINUED | COMMUNITY
Start: 2020-03-13 | End: 2021-03-09

## 2021-03-09 NOTE — HISTORY OF PRESENT ILLNESS
[FreeTextEntry1] : Patient is a 56-yo male with h/o HTN and hyperlipidemia. Patient presented with progressive angina on exertion. MPI showed: severe inferior, inferolateral ischemia.\par \par LHC showed a very large dominant RCA with sequential areas of subtotal occlusion and large thrombus, 70% stenosis of distal LAD and OM. S/p 3-vessel CABG (LIMA, left radial and SVG grafts).\par \par Patient denies CP, SOB, cough, edema since surgery.

## 2021-03-09 NOTE — ASSESSMENT
[FreeTextEntry1] : 56-yo male with h/o HTN, hyperlipidemia, CAD, recovering from CABG.\par \par Plan:\par Continue ASA, Amlodipine.\par Resume Imdur 30 mg daily (radial graft).\par Will increase Rosuvastatin after the BW (scheduled next week).\par Will replace Metoprolol with Bystolic 10 mg daily.\par Continue to increase daily walking, start adding light weights and upper body exercise in 2 weeks.\par Diet, weight loss discussed again.\par \par Jairon Weinstein MD\par

## 2021-03-09 NOTE — PHYSICAL EXAM
[General Appearance - Well Developed] : well developed [Normal Appearance] : normal appearance [Well Groomed] : well groomed [General Appearance - Well Nourished] : well nourished [No Deformities] : no deformities [General Appearance - In No Acute Distress] : no acute distress [Normal Conjunctiva] : the conjunctiva exhibited no abnormalities [Respiration, Rhythm And Depth] : normal respiratory rhythm and effort [Exaggerated Use Of Accessory Muscles For Inspiration] : no accessory muscle use [Auscultation Breath Sounds / Voice Sounds] : lungs were clear to auscultation bilaterally [Heart Rate And Rhythm] : heart rate and rhythm were normal [Heart Sounds] : normal S1 and S2 [Murmurs] : no murmurs present [Arterial Pulses Normal] : the arterial pulses were normal [Edema] : no peripheral edema present [FreeTextEntry1] : Sternotomy scar healing very well [Bowel Sounds] : normal bowel sounds [Abdomen Soft] : soft [Abdomen Tenderness] : non-tender [Cyanosis, Localized] : no localized cyanosis [Skin Color & Pigmentation] : normal skin color and pigmentation [] : no rash [Oriented To Time, Place, And Person] : oriented to person, place, and time

## 2021-05-11 ENCOUNTER — APPOINTMENT (OUTPATIENT)
Dept: CARDIOLOGY | Facility: CLINIC | Age: 56
End: 2021-05-11
Payer: COMMERCIAL

## 2021-05-11 ENCOUNTER — RESULT CHARGE (OUTPATIENT)
Age: 56
End: 2021-05-11

## 2021-05-11 VITALS
BODY MASS INDEX: 35.77 KG/M2 | HEIGHT: 68 IN | WEIGHT: 236 LBS | DIASTOLIC BLOOD PRESSURE: 82 MMHG | SYSTOLIC BLOOD PRESSURE: 128 MMHG

## 2021-05-11 PROCEDURE — 99072 ADDL SUPL MATRL&STAF TM PHE: CPT

## 2021-05-11 PROCEDURE — 93000 ELECTROCARDIOGRAM COMPLETE: CPT

## 2021-05-11 PROCEDURE — 99214 OFFICE O/P EST MOD 30 MIN: CPT

## 2021-05-11 RX ORDER — AMLODIPINE BESYLATE 2.5 MG/1
2.5 TABLET ORAL DAILY
Refills: 0 | Status: DISCONTINUED | COMMUNITY
Start: 2021-02-17 | End: 2021-05-11

## 2021-05-11 NOTE — HISTORY OF PRESENT ILLNESS
[FreeTextEntry1] : Patient is a 56-yo male with h/o HTN and hyperlipidemia.\par \par LHC in February 2021 showed a very large dominant RCA with sequential areas of subtotal occlusion and large thrombus, 70% stenosis of distal LAD and OM. S/p 3-vessel CABG (LIMA, left radial and SVG grafts).\par \par Patient denies CP, SOB, cough, edema since surgery. Still c/o chest wall pain and tightness.\par \par LDL 37\par GFR 74\par Hb 14.2.

## 2021-05-11 NOTE — PHYSICAL EXAM
[Well Developed] : well developed [Well Nourished] : well nourished [No Acute Distress] : no acute distress [Normal Conjunctiva] : normal conjunctiva [Normal Venous Pressure] : normal venous pressure [No Carotid Bruit] : no carotid bruit [Normal S1, S2] : normal S1, S2 [No Murmur] : no murmur [No Rub] : no rub [No Gallop] : no gallop [Clear Lung Fields] : clear lung fields [Good Air Entry] : good air entry [No Respiratory Distress] : no respiratory distress  [Soft] : abdomen soft [Non Tender] : non-tender [Normal Bowel Sounds] : normal bowel sounds [Normal Gait] : normal gait [No Edema] : no edema [No Clubbing] : no clubbing [No Cyanosis] : no cyanosis [No Varicosities] : no varicosities [No Rash] : no rash [Moves all extremities] : moves all extremities [No Focal Deficits] : no focal deficits [Normal Speech] : normal speech [Alert and Oriented] : alert and oriented [Normal memory] : normal memory

## 2021-07-09 NOTE — ASSESSMENT
[FreeTextEntry1] : 56-yo male with h/o HTN, hyperlipidemia, CAD, recovering from CABG.\par \par Plan:\par Continue Amlodipine, Imdur 30 mg daily (radial graft).\par Reduce ASA to 81 mg daily.\par Reduce Rosuvastatin to 10 mg daily.\par Continue Bystolic 10 mg daily.\par Continue to increase daily walking, start adding increasing weights and upper body exercise.\par Diet, weight loss discussed again.\par Repeat blood work prior to next visit.\par F/u in 4 months.\par \par Jairon Weinstein MD\par 
on unit

## 2021-09-09 ENCOUNTER — RX RENEWAL (OUTPATIENT)
Age: 56
End: 2021-09-09

## 2021-12-10 ENCOUNTER — APPOINTMENT (OUTPATIENT)
Dept: CARDIOLOGY | Facility: CLINIC | Age: 56
End: 2021-12-10
Payer: COMMERCIAL

## 2021-12-10 VITALS
HEIGHT: 68 IN | WEIGHT: 244 LBS | SYSTOLIC BLOOD PRESSURE: 148 MMHG | DIASTOLIC BLOOD PRESSURE: 80 MMHG | BODY MASS INDEX: 36.98 KG/M2

## 2021-12-10 VITALS — SYSTOLIC BLOOD PRESSURE: 118 MMHG | DIASTOLIC BLOOD PRESSURE: 82 MMHG

## 2021-12-10 PROCEDURE — 93000 ELECTROCARDIOGRAM COMPLETE: CPT

## 2021-12-10 PROCEDURE — 99213 OFFICE O/P EST LOW 20 MIN: CPT

## 2021-12-10 RX ORDER — ASPIRIN 325 MG/1
325 TABLET, FILM COATED ORAL DAILY
Refills: 0 | Status: DISCONTINUED | COMMUNITY
End: 2021-12-10

## 2021-12-10 RX ORDER — ASPIRIN 81 MG
81 TABLET, DELAYED RELEASE (ENTERIC COATED) ORAL DAILY
Refills: 0 | Status: ACTIVE | COMMUNITY

## 2021-12-10 NOTE — HISTORY OF PRESENT ILLNESS
[FreeTextEntry1] : Patient is a 56-yo male with h/o HTN and hyperlipidemia.\par \par LHC in February 2021 showed a very large dominant RCA with sequential areas of subtotal occlusion and large thrombus, 70% stenosis of distal LAD and OM. S/p 3-vessel CABG (LIMA, left radial and SVG grafts).\par \par Patient denies CP, SOB, cough, edema since surgery. Still c/o chest wall pain and tightness.\par \par He walks 6-8 miles a day, has restarted weight training.\par

## 2021-12-10 NOTE — PHYSICAL EXAM
[Well Developed] : well developed [No Acute Distress] : no acute distress [Normal Conjunctiva] : normal conjunctiva [Normal Venous Pressure] : normal venous pressure [No Carotid Bruit] : no carotid bruit [Normal S1, S2] : normal S1, S2 [No Murmur] : no murmur [No Rub] : no rub [No Gallop] : no gallop [Clear Lung Fields] : clear lung fields [Good Air Entry] : good air entry [No Respiratory Distress] : no respiratory distress  [Soft] : abdomen soft [Non Tender] : non-tender [Normal Bowel Sounds] : normal bowel sounds [Normal Gait] : normal gait [No Edema] : no edema [No Cyanosis] : no cyanosis [No Clubbing] : no clubbing [No Varicosities] : no varicosities [No Rash] : no rash [Moves all extremities] : moves all extremities [No Focal Deficits] : no focal deficits [Normal Speech] : normal speech [Alert and Oriented] : alert and oriented [Normal memory] : normal memory [Obese] : obese

## 2021-12-14 ENCOUNTER — RESULT CHARGE (OUTPATIENT)
Age: 56
End: 2021-12-14

## 2021-12-30 ENCOUNTER — RX RENEWAL (OUTPATIENT)
Age: 56
End: 2021-12-30

## 2022-06-13 ENCOUNTER — RX RENEWAL (OUTPATIENT)
Age: 57
End: 2022-06-13

## 2022-06-27 ENCOUNTER — RX RENEWAL (OUTPATIENT)
Age: 57
End: 2022-06-27

## 2022-08-02 ENCOUNTER — RESULT CHARGE (OUTPATIENT)
Age: 57
End: 2022-08-02

## 2022-08-02 ENCOUNTER — APPOINTMENT (OUTPATIENT)
Dept: CARDIOLOGY | Facility: CLINIC | Age: 57
End: 2022-08-02

## 2022-08-02 VITALS
TEMPERATURE: 96.7 F | HEART RATE: 91 BPM | SYSTOLIC BLOOD PRESSURE: 140 MMHG | DIASTOLIC BLOOD PRESSURE: 82 MMHG | HEIGHT: 68 IN | WEIGHT: 227 LBS | BODY MASS INDEX: 34.4 KG/M2

## 2022-08-02 VITALS — SYSTOLIC BLOOD PRESSURE: 130 MMHG | DIASTOLIC BLOOD PRESSURE: 80 MMHG

## 2022-08-02 DIAGNOSIS — N52.9 MALE ERECTILE DYSFUNCTION, UNSPECIFIED: ICD-10-CM

## 2022-08-02 DIAGNOSIS — R94.31 ABNORMAL ELECTROCARDIOGRAM [ECG] [EKG]: ICD-10-CM

## 2022-08-02 PROCEDURE — 99214 OFFICE O/P EST MOD 30 MIN: CPT | Mod: 25

## 2022-08-02 PROCEDURE — 93000 ELECTROCARDIOGRAM COMPLETE: CPT

## 2022-08-02 RX ORDER — ISOSORBIDE MONONITRATE 30 MG/1
30 TABLET, EXTENDED RELEASE ORAL
Qty: 90 | Refills: 1 | Status: DISCONTINUED | COMMUNITY
Start: 2021-03-09 | End: 2022-08-02

## 2022-08-02 RX ORDER — ROSUVASTATIN CALCIUM 20 MG/1
20 TABLET, FILM COATED ORAL DAILY
Refills: 0 | Status: DISCONTINUED | COMMUNITY
Start: 2021-02-17 | End: 2022-08-02

## 2022-08-02 RX ORDER — TURMERIC ROOT EXTRACT 500 MG
TABLET ORAL
Refills: 0 | Status: DISCONTINUED | COMMUNITY
End: 2022-08-02

## 2022-08-02 RX ORDER — NEBIVOLOL 10 MG/1
10 TABLET ORAL
Qty: 90 | Refills: 1 | Status: DISCONTINUED | COMMUNITY
Start: 2022-06-13 | End: 2022-08-02

## 2022-08-02 NOTE — HISTORY OF PRESENT ILLNESS
[FreeTextEntry1] : Patient is a 57-yo male with h/o HTN and hyperlipidemia.\par \par LHC in February 2021 showed a very large dominant RCA with sequential areas of subtotal occlusion and large thrombus, 70% stenosis of distal LAD and OM. S/p 3-vessel CABG (LIMA, left radial and SVG grafts).\par \par Patient denies CP, SOB, cough, edema since surgery. \par \par He walks 10 miles a day, has restarted weight training.\par \par

## 2022-08-02 NOTE — REVIEW OF SYSTEMS
[Negative] : Neurological [Erectile Dysfunction] : erectile dysfunction [Rash] : no rash [Anxiety] : no anxiety [Easy Bleeding] : no tendency for easy bleeding [Easy Bruising] : no tendency for easy bruising

## 2022-08-02 NOTE — ASSESSMENT
[FreeTextEntry1] : 57-yo male with h/o HTN, hyperlipidemia, CAD, s/p CABG.\par ED.\par \par Plan:\par Continue ASA, Rosuvastatin.\par D/c Imdur.\par Increase Bystolic to 20 mg daily.\par Cialis 20 mg prescribed.\par Continue diet, exercise, weight loss.\par Repeat blood work scheduled.\par F/u in 6 months.\par \par Jairon Weinstein MD\par

## 2022-12-30 ENCOUNTER — NON-APPOINTMENT (OUTPATIENT)
Age: 57
End: 2022-12-30

## 2022-12-31 ENCOUNTER — EMERGENCY (EMERGENCY)
Facility: HOSPITAL | Age: 57
LOS: 0 days | Discharge: HOME | End: 2022-12-31
Attending: STUDENT IN AN ORGANIZED HEALTH CARE EDUCATION/TRAINING PROGRAM | Admitting: STUDENT IN AN ORGANIZED HEALTH CARE EDUCATION/TRAINING PROGRAM
Payer: COMMERCIAL

## 2022-12-31 VITALS
RESPIRATION RATE: 20 BRPM | OXYGEN SATURATION: 98 % | TEMPERATURE: 98 F | HEART RATE: 88 BPM | SYSTOLIC BLOOD PRESSURE: 138 MMHG | DIASTOLIC BLOOD PRESSURE: 93 MMHG

## 2022-12-31 DIAGNOSIS — R04.0 EPISTAXIS: ICD-10-CM

## 2022-12-31 DIAGNOSIS — Z95.1 PRESENCE OF AORTOCORONARY BYPASS GRAFT: ICD-10-CM

## 2022-12-31 DIAGNOSIS — E78.5 HYPERLIPIDEMIA, UNSPECIFIED: ICD-10-CM

## 2022-12-31 DIAGNOSIS — Z79.82 LONG TERM (CURRENT) USE OF ASPIRIN: ICD-10-CM

## 2022-12-31 DIAGNOSIS — I25.10 ATHEROSCLEROTIC HEART DISEASE OF NATIVE CORONARY ARTERY WITHOUT ANGINA PECTORIS: ICD-10-CM

## 2022-12-31 DIAGNOSIS — I10 ESSENTIAL (PRIMARY) HYPERTENSION: ICD-10-CM

## 2022-12-31 PROCEDURE — 99283 EMERGENCY DEPT VISIT LOW MDM: CPT | Mod: 25

## 2022-12-31 PROCEDURE — 30901 CONTROL OF NOSEBLEED: CPT

## 2022-12-31 RX ORDER — TRANEXAMIC ACID 100 MG/ML
5 INJECTION, SOLUTION INTRAVENOUS ONCE
Refills: 0 | Status: COMPLETED | OUTPATIENT
Start: 2022-12-31 | End: 2022-12-31

## 2022-12-31 RX ADMIN — TRANEXAMIC ACID 5 MILLILITER(S): 100 INJECTION, SOLUTION INTRAVENOUS at 12:46

## 2022-12-31 NOTE — ED PROVIDER NOTE - PATIENT PORTAL LINK FT
You can access the FollowMyHealth Patient Portal offered by [facility] by registering at the following website: [email or web address]. By joining [employer]’s FollowMyHealth portal, you will also be able to view your health information using other applications (apps) compatible with our system.

## 2022-12-31 NOTE — ED ADULT TRIAGE NOTE - CHIEF COMPLAINT QUOTE
pt came to ED from urgent care, states he has had a nose bleed since 6 AM today that "won't stop". endorses taking 81mg aspirin daily

## 2022-12-31 NOTE — ED PROVIDER NOTE - NSFOLLOWUPINSTRUCTIONS_ED_ALL_ED_FT
Our Emergency Department Referral Coordinators will be reaching out to you in the next 24-48 hours from 9:00am to 5:00pm (Monday - Friday) with a follow up appointment. Please expect a phone call from the hospital in that time frame. If you do not receive a call or if you have any questions or concerns, you can reach them at [phone] or [phone].      Epistaxis (nosebleed)    Nosebleeds are common and can be caused by many conditions, such as injury, infections, dry mucous membranes or dry climate, medicines, nose picking, and home heating and cooling systems. Try controlling your nosebleed by pinching your nose continuously for at least 10 minutes. Avoid lying down while you are having a nosebleed. Sit up and lean forward. Avoid blowing or sniffing your nose for a number of hours after having a nosebleed. Resume your normal activities as you are able, but avoid straining, lifting, or bending at the waist for several days. Maintain humidity in your home by using less air conditioning or by using a humidifier.     If your nose was packed by your health care provider, try to maintain the pack inside of your nose until your health care provider removes it. If a balloon catheter was used to pack your nose, do not cut or remove it unless your health care provider has instructed you to do that.     Aspirin and blood thinners make bleeding more likely. If you are prescribed these medicines and you suffer from nosebleeds, ask your health care provider if you should stop taking the medicines or adjust the dose. Do not stop medicines unless directed by your health care provider     SEEK IMMEDIATE MEDICAL CARE IF YOU HAVE THE FOLLOWING SYMPTOMS: nosebleed lasting longer than 20 minutes, unusual bleeding from or bruising on other parts of your body, dizziness or lightheadedness, nosebleed occurring after a head injury, or fever.

## 2022-12-31 NOTE — ED PROVIDER NOTE - PROGRESS NOTE DETAILS
kondrat- Bleeding controlled with anterior nasal packing.  Able to be discharged with ENT follow-up.

## 2022-12-31 NOTE — ED PROVIDER NOTE - NSPTACCESSSVCSAPPTDETAILS_ED_ALL_ED_FT
Patient with epistaxis requiring nasal packing in need of evaluation and treatment by ENT physician.

## 2022-12-31 NOTE — ED ADULT TRIAGE NOTE - HEART RATE (BEATS/MIN)
88 Detail Level: Detailed Post-Care Instructions: I reviewed with the patient in detail post-care instructions. Patient is to wear sunprotection, and avoid picking at any of the treated lesions. Pt may apply Vaseline to crusted or scabbing areas. Consent: The patient's consent was obtained including but not limited to risks of crusting, scabbing, blistering, scarring, darker or lighter pigmentary change, recurrence, incomplete removal and infection. Render Note In Bullet Format When Appropriate: No Duration Of Freeze Thaw-Cycle (Seconds): 0

## 2022-12-31 NOTE — ED PROVIDER NOTE - NS ED ROS FT
Constitutional:  No fevers or chills.  Eyes:  No visual changes, eye pain, or discharge.  ENT:  No hearing changes. No sore throat.  (+) epistaxis  Neck:  No neck pain or stiffness.  Cardiac:  No CP or edema.  Resp:  No cough or SOB. No hemoptysis.   GI:  No nausea, vomiting, diarrhea, or abdominal pain.  :  No dysuria, frequency, or hematuria.  MSK:  No myalgias or joint pain/swelling.  Neuro:  No headache, dizziness, or weakness.  Skin:  No skin rash.

## 2022-12-31 NOTE — ED PROVIDER NOTE - PHYSICAL EXAMINATION
PHYSICAL EXAM: I have reviewed current vital signs.  GENERAL: NAD, well-nourished; well-developed.  HEAD:  Normocephalic, atraumatic.  EYES: EOMI, PERRL, conjunctiva and sclera clear.  ENT: MMM, no erythema/exudates.  Left nare with dried blood mixed with fresh blood unable to visualize source of bleeding.  Patient has dried blood in the posterior pharynx.  NECK: Supple, no JVD.  CHEST/LUNG: Clear to auscultation bilaterally; no wheezes, rales, or rhonchi.  HEART: Regular rate and rhythm, normal S1 and S2; no murmurs, rubs, or gallops.  ABDOMEN: Soft, nontender, nondistended.  EXTREMITIES:  2+ peripheral pulses; no clubbing, cyanosis, or edema.  PSYCH: Cooperative, appropriate, normal mood and affect.  NEUROLOGY: A&O x 3. Motor 5/5. Sensory intact. No focal neurological deficits. CN II - XII intact. (-) dysmetria, facial droop, pronator drift.  SKIN: Warm and dry.

## 2022-12-31 NOTE — ED PROVIDER NOTE - OBJECTIVE STATEMENT
57-year-old male with past history of CAD status post CABG x3 on ASA, hypertension, hyperlipidemia presents with epistaxis since 5 AM this morning presented to urgent care unable to control bleeding with pressure.  Patient states he feels like he is swallowing blood.  Patient denies dizziness lightheadedness chest pain shortness of breath.  Patient has no history of easy bruising

## 2022-12-31 NOTE — ED PROVIDER NOTE - CLINICAL SUMMARY MEDICAL DECISION MAKING FREE TEXT BOX
.    56 y/o M pmh as noted on ASA, p/w atraumatic epistaxis since this 5am.  No cp, sob, dizziness, prior abnl bleeding.  ROS PE above.  Bleeding controlled w/ nasal packing.  IMP: epistaxis.  Pt stable for dc w/ outpt ENT f/up, and care as discussed.    Pt understands plan and signs and symptoms for ED return. DC home.     .

## 2022-12-31 NOTE — ED PROVIDER NOTE - CONSIDERATION OF ADMISSION OBSERVATION
Given HPI, PMH, PE, w/up and ED course, admission not indicated Consideration of Admission/Observation

## 2023-01-23 ENCOUNTER — RX RENEWAL (OUTPATIENT)
Age: 58
End: 2023-01-23

## 2023-01-23 RX ORDER — NEBIVOLOL 20 MG/1
20 TABLET ORAL
Qty: 90 | Refills: 1 | Status: ACTIVE | COMMUNITY
Start: 2021-03-09 | End: 1900-01-01

## 2023-02-03 NOTE — ASSESSMENT
[FreeTextEntry1] : 56-yo male with h/o HTN, hyperlipidemia, CAD, s/p CABG.\par \par Plan:\par Continue treatment.\par Diet, weight loss discussed again.\par Repeat blood work prior to next visit.\par F/u in 6 months.\par \par Jairon Weinstein MD\par 
General:     NAD, well-nourished, well-appearing  Head:     NC/AT, EOMI, oral mucosa moist  Neck:     trachea midline  Lungs:     CTA b/l, no w/r/r  CVS:     S1S2, RRR, no m/g/r  Abd:     +BS, s/nt/nd, no organomegaly  Ext:    2+ radial and pedal pulses, no c/c/e  Neuro: AAOx3, no sensory/motor deficits

## 2023-02-08 ENCOUNTER — RX RENEWAL (OUTPATIENT)
Age: 58
End: 2023-02-08

## 2023-02-08 RX ORDER — ROSUVASTATIN CALCIUM 10 MG/1
10 TABLET, FILM COATED ORAL
Qty: 90 | Refills: 1 | Status: ACTIVE | COMMUNITY
Start: 2021-07-02 | End: 1900-01-01

## 2023-03-09 NOTE — ED PROVIDER NOTE - CARE PROVIDER_API CALL
Miguel Escoto)  Otolaryngology  [address] [place] [address], [address] [place] [address]  Phone: [phone]  Fax: [phone]  Follow Up Time: 1-3 Days   Spironolactone Pregnancy And Lactation Text: This medication can cause feminization of the male fetus and should be avoided during pregnancy. The active metabolite is also found in breast milk.

## 2023-04-04 ENCOUNTER — RESULT CHARGE (OUTPATIENT)
Age: 58
End: 2023-04-04

## 2023-04-04 ENCOUNTER — APPOINTMENT (OUTPATIENT)
Dept: CARDIOLOGY | Facility: CLINIC | Age: 58
End: 2023-04-04
Payer: COMMERCIAL

## 2023-04-04 VITALS
HEIGHT: 68 IN | WEIGHT: 239 LBS | HEART RATE: 88 BPM | SYSTOLIC BLOOD PRESSURE: 140 MMHG | DIASTOLIC BLOOD PRESSURE: 100 MMHG | BODY MASS INDEX: 36.22 KG/M2 | RESPIRATION RATE: 14 BRPM

## 2023-04-04 VITALS — SYSTOLIC BLOOD PRESSURE: 130 MMHG | DIASTOLIC BLOOD PRESSURE: 86 MMHG

## 2023-04-04 PROCEDURE — 93000 ELECTROCARDIOGRAM COMPLETE: CPT

## 2023-04-04 PROCEDURE — 99213 OFFICE O/P EST LOW 20 MIN: CPT | Mod: 25

## 2023-04-04 RX ORDER — TADALAFIL 20 MG/1
20 TABLET ORAL
Qty: 20 | Refills: 3 | Status: DISCONTINUED | COMMUNITY
Start: 2022-08-02 | End: 2023-04-04

## 2023-04-04 RX ORDER — CHROMIUM 200 MCG
TABLET ORAL
Refills: 0 | Status: DISCONTINUED | COMMUNITY
End: 2023-04-04

## 2023-04-04 NOTE — HISTORY OF PRESENT ILLNESS
[FreeTextEntry1] : Patient is a 57-yo male with h/o HTN and hyperlipidemia.\par \par LHC in February 2021 showed a very large dominant RCA with sequential areas of subtotal occlusion and large thrombus, 70% stenosis of distal LAD and OM. S/p 3-vessel CABG (LIMA, left radial and SVG grafts).\par \par Patient denies CP, SOB, cough, edema since surgery. \par \par He walks and runs 10 miles a day, has restarted weight training.\par \par

## 2023-04-04 NOTE — REVIEW OF SYSTEMS
[Erectile Dysfunction] : erectile dysfunction [Negative] : Neurological [Rash] : no rash [Anxiety] : no anxiety [Easy Bleeding] : no tendency for easy bleeding [Easy Bruising] : no tendency for easy bruising

## 2023-04-04 NOTE — ASSESSMENT
[FreeTextEntry1] : 57-yo male with h/o HTN, hyperlipidemia, CAD, s/p CABG.\par BP elevated today.\par \par Plan:\par Continue ASA, Rosuvastatin.\par Continue Bystolic to 20 mg daily for now. Patient will monitor his BP at HR at home, will set up a tele visit in 2 weeks.\par Continue diet, exercise, weight loss.\par Repeat blood work pending.\par F/u in 6 months.\par \par Jairon Weinstein MD\par

## 2023-04-18 ENCOUNTER — APPOINTMENT (OUTPATIENT)
Dept: CARDIOLOGY | Facility: CLINIC | Age: 58
End: 2023-04-18
Payer: COMMERCIAL

## 2023-04-18 PROCEDURE — 99441: CPT

## 2023-11-02 ENCOUNTER — APPOINTMENT (OUTPATIENT)
Dept: CARDIOLOGY | Facility: CLINIC | Age: 58
End: 2023-11-02
Payer: COMMERCIAL

## 2023-11-02 ENCOUNTER — APPOINTMENT (OUTPATIENT)
Dept: CARDIOLOGY | Facility: CLINIC | Age: 58
End: 2023-11-02

## 2023-11-03 ENCOUNTER — APPOINTMENT (OUTPATIENT)
Dept: CARDIOLOGY | Facility: CLINIC | Age: 58
End: 2023-11-03
Payer: COMMERCIAL

## 2023-11-03 VITALS
DIASTOLIC BLOOD PRESSURE: 90 MMHG | HEART RATE: 78 BPM | HEIGHT: 68 IN | BODY MASS INDEX: 34.1 KG/M2 | WEIGHT: 225 LBS | SYSTOLIC BLOOD PRESSURE: 144 MMHG

## 2023-11-03 PROCEDURE — 99213 OFFICE O/P EST LOW 20 MIN: CPT | Mod: 25

## 2023-11-03 PROCEDURE — 93000 ELECTROCARDIOGRAM COMPLETE: CPT

## 2024-04-07 ENCOUNTER — OUTPATIENT (OUTPATIENT)
Dept: OUTPATIENT SERVICES | Facility: HOSPITAL | Age: 59
LOS: 1 days | End: 2024-04-07
Payer: COMMERCIAL

## 2024-04-07 DIAGNOSIS — Z00.8 ENCOUNTER FOR OTHER GENERAL EXAMINATION: ICD-10-CM

## 2024-04-07 DIAGNOSIS — E86.0 DEHYDRATION: ICD-10-CM

## 2024-04-07 DIAGNOSIS — R79.9 ABNORMAL FINDING OF BLOOD CHEMISTRY, UNSPECIFIED: ICD-10-CM

## 2024-04-07 PROCEDURE — 76775 US EXAM ABDO BACK WALL LIM: CPT

## 2024-04-07 PROCEDURE — 76775 US EXAM ABDO BACK WALL LIM: CPT | Mod: 26

## 2024-04-08 DIAGNOSIS — R79.9 ABNORMAL FINDING OF BLOOD CHEMISTRY, UNSPECIFIED: ICD-10-CM

## 2024-04-08 DIAGNOSIS — E86.0 DEHYDRATION: ICD-10-CM

## 2024-05-07 ENCOUNTER — APPOINTMENT (OUTPATIENT)
Dept: CARDIOLOGY | Facility: CLINIC | Age: 59
End: 2024-05-07
Payer: COMMERCIAL

## 2024-05-07 VITALS
HEART RATE: 76 BPM | DIASTOLIC BLOOD PRESSURE: 84 MMHG | WEIGHT: 230 LBS | BODY MASS INDEX: 34.86 KG/M2 | SYSTOLIC BLOOD PRESSURE: 134 MMHG | HEIGHT: 68 IN

## 2024-05-07 DIAGNOSIS — I10 ESSENTIAL (PRIMARY) HYPERTENSION: ICD-10-CM

## 2024-05-07 DIAGNOSIS — I25.10 ATHEROSCLEROTIC HEART DISEASE OF NATIVE CORONARY ARTERY W/OUT ANGINA PECTORIS: ICD-10-CM

## 2024-05-07 DIAGNOSIS — Z95.1 PRESENCE OF AORTOCORONARY BYPASS GRAFT: ICD-10-CM

## 2024-05-07 DIAGNOSIS — E78.5 HYPERLIPIDEMIA, UNSPECIFIED: ICD-10-CM

## 2024-05-07 PROCEDURE — 93000 ELECTROCARDIOGRAM COMPLETE: CPT

## 2024-05-07 PROCEDURE — 99213 OFFICE O/P EST LOW 20 MIN: CPT | Mod: 25

## 2024-05-07 NOTE — HISTORY OF PRESENT ILLNESS
[FreeTextEntry1] : Patient is a 59-yo male with h/o HTN and hyperlipidemia.  LHC in February 2021 showed a very large dominant RCA with sequential areas of subtotal occlusion and large thrombus, 70% stenosis of distal LAD and OM. S/p 3-vessel CABG (LIMA, left radial and SVG grafts).  Patient presents for a follow up visit. He denies chest pain or SOB. He exercises daily, has been jogging 6 miles and lifting weights.   LDL 52 GFR 55 Cre 1.47.

## 2024-05-07 NOTE — ASSESSMENT
[FreeTextEntry1] : 59-yo male with h/o HTN, hyperlipidemia, CAD, s/p CABG. HTN  Plan: Continue ASA, Rosuvastatin. Continue Bystolic 20 mg daily  Continue diet, exercise, weight loss. Repeat blood work  F/u in 6 months.  Jairon Weinstein MD .
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
## 2024-08-29 ENCOUNTER — RX RENEWAL (OUTPATIENT)
Age: 59
End: 2024-08-29

## 2024-11-18 ENCOUNTER — NON-APPOINTMENT (OUTPATIENT)
Age: 59
End: 2024-11-18

## 2024-11-18 ENCOUNTER — APPOINTMENT (OUTPATIENT)
Dept: CARDIOLOGY | Facility: CLINIC | Age: 59
End: 2024-11-18
Payer: COMMERCIAL

## 2024-11-18 VITALS
WEIGHT: 238 LBS | HEIGHT: 68 IN | DIASTOLIC BLOOD PRESSURE: 88 MMHG | BODY MASS INDEX: 36.07 KG/M2 | HEART RATE: 83 BPM | SYSTOLIC BLOOD PRESSURE: 132 MMHG

## 2024-11-18 DIAGNOSIS — I25.10 ATHEROSCLEROTIC HEART DISEASE OF NATIVE CORONARY ARTERY W/OUT ANGINA PECTORIS: ICD-10-CM

## 2024-11-18 DIAGNOSIS — E78.5 HYPERLIPIDEMIA, UNSPECIFIED: ICD-10-CM

## 2024-11-18 DIAGNOSIS — Z95.1 PRESENCE OF AORTOCORONARY BYPASS GRAFT: ICD-10-CM

## 2024-11-18 DIAGNOSIS — I10 ESSENTIAL (PRIMARY) HYPERTENSION: ICD-10-CM

## 2024-11-18 PROCEDURE — 93000 ELECTROCARDIOGRAM COMPLETE: CPT

## 2024-11-18 PROCEDURE — 99214 OFFICE O/P EST MOD 30 MIN: CPT | Mod: 25

## 2025-05-02 ENCOUNTER — APPOINTMENT (OUTPATIENT)
Dept: CARDIOLOGY | Facility: CLINIC | Age: 60
End: 2025-05-02
Payer: COMMERCIAL

## 2025-05-02 ENCOUNTER — NON-APPOINTMENT (OUTPATIENT)
Age: 60
End: 2025-05-02

## 2025-05-02 VITALS
BODY MASS INDEX: 36.07 KG/M2 | HEART RATE: 75 BPM | WEIGHT: 238 LBS | HEIGHT: 68 IN | DIASTOLIC BLOOD PRESSURE: 80 MMHG | SYSTOLIC BLOOD PRESSURE: 132 MMHG

## 2025-05-02 DIAGNOSIS — E78.5 HYPERLIPIDEMIA, UNSPECIFIED: ICD-10-CM

## 2025-05-02 DIAGNOSIS — I10 ESSENTIAL (PRIMARY) HYPERTENSION: ICD-10-CM

## 2025-05-02 DIAGNOSIS — Z95.1 PRESENCE OF AORTOCORONARY BYPASS GRAFT: ICD-10-CM

## 2025-05-02 DIAGNOSIS — I25.10 ATHEROSCLEROTIC HEART DISEASE OF NATIVE CORONARY ARTERY W/OUT ANGINA PECTORIS: ICD-10-CM

## 2025-05-02 PROCEDURE — 99214 OFFICE O/P EST MOD 30 MIN: CPT | Mod: 25

## 2025-05-02 PROCEDURE — 93000 ELECTROCARDIOGRAM COMPLETE: CPT
